# Patient Record
Sex: FEMALE | Race: BLACK OR AFRICAN AMERICAN | Employment: FULL TIME
[De-identification: names, ages, dates, MRNs, and addresses within clinical notes are randomized per-mention and may not be internally consistent; named-entity substitution may affect disease eponyms.]

---

## 2021-05-14 PROBLEM — C73 PAPILLARY THYROID CARCINOMA (HCC): Status: ACTIVE | Noted: 2021-05-14

## 2022-09-17 ENCOUNTER — NURSE TRIAGE (OUTPATIENT)
Dept: OTHER | Facility: CLINIC | Age: 57
End: 2022-09-17

## 2022-09-17 NOTE — TELEPHONE ENCOUNTER
Subjective: Caller states \"probable yeast innfection\"     Current Symptoms: white vaginal discharge itching and painful unrination, fatigue also and getting worse on urgency or frequency with urination no fevers c/o fatigue, and mild lower abd pain no significant med hx noted    Recommended to be seen in24 hours either [facility] or [facility]    Onset: 3 days ago; gradual    Associated Symptoms: NA    Pain Severity: 7/10; burning; intermittent    Temperature: none       What has been tried: nothing    LMP: NA Pregnant: NA    Recommended disposition: See PCP within 24 Hours    Care advice provided, patient verbalizes understanding; denies any other questions or concerns; instructed to call back for any new or worsening symptoms. This triage is a result of a call to [address] [place] [address]. Please do not respond to the triage nurse through this encounter. Any subsequent communication should be directly with the patient.       Reason for Disposition   [1] Mild lower abdominal pain comes and goes (cramps) AND [2] lasts > 24 hours    Protocols used: Vaginal Discharge-ADULT-

## 2023-09-08 ENCOUNTER — OFFICE VISIT (OUTPATIENT)
Dept: PRIMARY CARE | Facility: CLINIC | Age: 58
End: 2023-09-08
Payer: COMMERCIAL

## 2023-09-08 VITALS
HEART RATE: 98 BPM | TEMPERATURE: 97.8 F | HEIGHT: 69 IN | BODY MASS INDEX: 32.73 KG/M2 | SYSTOLIC BLOOD PRESSURE: 116 MMHG | DIASTOLIC BLOOD PRESSURE: 75 MMHG | WEIGHT: 221 LBS

## 2023-09-08 DIAGNOSIS — Z13.1 SCREENING FOR DIABETES MELLITUS: ICD-10-CM

## 2023-09-08 DIAGNOSIS — R59.0 LYMPHADENOPATHY OF HEAD AND NECK REGION: ICD-10-CM

## 2023-09-08 DIAGNOSIS — Z13.6 SCREENING FOR CARDIOVASCULAR CONDITION: ICD-10-CM

## 2023-09-08 DIAGNOSIS — N95.1 MENOPAUSAL SYNDROME (HOT FLASHES): ICD-10-CM

## 2023-09-08 DIAGNOSIS — Z12.11 ENCOUNTER FOR SCREENING FOR MALIGNANT NEOPLASM OF COLON: ICD-10-CM

## 2023-09-08 DIAGNOSIS — E89.0 POSTABLATIVE HYPOTHYROIDISM: Primary | ICD-10-CM

## 2023-09-08 DIAGNOSIS — C73 PAPILLARY THYROID CARCINOMA (MULTI): ICD-10-CM

## 2023-09-08 PROBLEM — R10.9 ABDOMINAL PAIN: Status: ACTIVE | Noted: 2023-09-08

## 2023-09-08 PROBLEM — R25.2 LEG CRAMPS: Status: ACTIVE | Noted: 2023-09-08

## 2023-09-08 PROBLEM — M54.9 BACK PAIN: Status: ACTIVE | Noted: 2023-09-08

## 2023-09-08 PROCEDURE — 99214 OFFICE O/P EST MOD 30 MIN: CPT | Performed by: INTERNAL MEDICINE

## 2023-09-08 PROCEDURE — 1036F TOBACCO NON-USER: CPT | Performed by: INTERNAL MEDICINE

## 2023-09-08 RX ORDER — VENLAFAXINE HYDROCHLORIDE 37.5 MG/1
37.5 TABLET, EXTENDED RELEASE ORAL DAILY
Qty: 90 TABLET | Refills: 3 | Status: SHIPPED | OUTPATIENT
Start: 2023-09-08 | End: 2023-09-11 | Stop reason: ALTCHOICE

## 2023-09-08 RX ORDER — LEVOTHYROXINE SODIUM 137 UG/1
TABLET ORAL
Qty: 96 TABLET | Refills: 3 | Status: SHIPPED | OUTPATIENT
Start: 2023-09-08

## 2023-09-08 RX ORDER — LEVOTHYROXINE SODIUM 137 UG/1
137 TABLET ORAL DAILY
COMMUNITY
End: 2023-09-08 | Stop reason: SDUPTHER

## 2023-09-08 RX ORDER — IBUPROFEN 800 MG/1
800 TABLET ORAL 3 TIMES DAILY PRN
COMMUNITY

## 2023-09-08 ASSESSMENT — ENCOUNTER SYMPTOMS
CHILLS: 0
COUGH: 0
SHORTNESS OF BREATH: 0
POLYDIPSIA: 0
PALPITATIONS: 0
FEVER: 0

## 2023-09-08 NOTE — PROGRESS NOTES
"Subjective   Patient ID: Simi Jefferson is a 58 y.o. female who presents for Establish Care (TC from [place]).    58-year-old female with a history of papillary thyroid cancer presents today to establish care after the [facility] of her PCP at the end of 2022.  I performed a detailed review of the patient's chart medical history records previous lab testing as part of today's visit.  I updated her chart according to any medical changes.  I updated her refills to her pharmacy as requested.  She is overdue for blood work which was ordered.  She is overdue for a mammogram which we discussed in detail and was ordered.  And she is due for colon cancer screening at this time last completing a  Cologuard in 2019.    In addition to the above issues there is a new concern of a left anterior neck palpable nodule/mass present for approximately 4 weeks.  Patient denies B symptoms.  Asymptomatic for upper respiratory disease cough sinus throat pain or tooth pain.  Discovered by accident when doing make-up.  There is been no pain in the region.    Patient notes postmenopausal hot flashes and sweats.  Interested in starting medication to treat this.  Sweats tends to be a little bit at night more than anything, question possibly related to issue as detailed above by more likely menopausal hot flashes we will delineate with further testing.         Review of Systems   Constitutional:  Negative for chills and fever.   Respiratory:  Negative for cough and shortness of breath.    Cardiovascular:  Negative for chest pain and palpitations.   Endocrine: Negative for polydipsia and polyuria.       Objective   /75 (Patient Position: Sitting)   Pulse 98   Temp 36.6 °C (97.8 °F) (Temporal)   Ht 1.74 m (5' 8.5\")   Wt 100 kg (221 lb)   BMI 33.11 kg/m²     Physical Exam  Constitutional:       Appearance: Normal appearance.   HENT:      Head: Normocephalic and atraumatic.   Eyes:      Extraocular Movements: Extraocular movements intact.      " Pupils: Pupils are equal, round, and reactive to light.   Neck:      Thyroid: No thyroid mass or thyromegaly.      Vascular: No carotid bruit.   Cardiovascular:      Rate and Rhythm: Normal rate and regular rhythm.      Heart sounds: No murmur heard.     No friction rub. No gallop.   Pulmonary:      Effort: No respiratory distress.      Breath sounds: No wheezing, rhonchi or rales.   Musculoskeletal:      Cervical back: Neck supple.      Right lower leg: No edema.      Left lower leg: No edema.   Lymphadenopathy:      Cervical: Cervical adenopathy (Left anterior cervical chain isolated single palpable lymph node around the level of the thyroid cartilage) present.   Neurological:      Mental Status: She is alert.         Assessment/Plan   Problem List Items Addressed This Visit       Papillary thyroid carcinoma (CMS/HCC)    Relevant Orders    CBC    Comprehensive Metabolic Panel    Lipid Panel    Hemoglobin A1C     Other Visit Diagnoses       Postablative hypothyroidism    -  Primary    Relevant Medications    levothyroxine (Synthroid, Levoxyl) 137 mcg tablet    Other Relevant Orders    CBC    Comprehensive Metabolic Panel    Lipid Panel    Hemoglobin A1C    Lymphadenopathy of head and neck region        Relevant Orders    CT soft tissue neck w IV contrast    Menopausal syndrome (hot flashes)        Relevant Medications    venlafaxine 37.5 mg 24 hr tablet    Screening for cardiovascular condition        Relevant Orders    CBC    Comprehensive Metabolic Panel    Lipid Panel    Hemoglobin A1C    Encounter for screening for malignant neoplasm of colon        Relevant Orders    Cologuard® colon cancer screening    BI mammo bilateral screening tomosynthesis    Screening for diabetes mellitus        Relevant Orders    CBC    Comprehensive Metabolic Panel    Lipid Panel    Hemoglobin A1C

## 2023-09-09 ENCOUNTER — TELEPHONE (OUTPATIENT)
Dept: PRIMARY CARE | Facility: CLINIC | Age: 58
End: 2023-09-09
Payer: COMMERCIAL

## 2023-09-11 DIAGNOSIS — N95.1 MENOPAUSAL SYNDROME (HOT FLASHES): Primary | ICD-10-CM

## 2023-09-11 RX ORDER — VENLAFAXINE HYDROCHLORIDE 37.5 MG/1
37.5 CAPSULE, EXTENDED RELEASE ORAL DAILY
Qty: 90 CAPSULE | Refills: 3 | Status: SHIPPED | OUTPATIENT
Start: 2023-09-11 | End: 2024-09-10

## 2023-11-18 DIAGNOSIS — J01.40 SUBACUTE PANSINUSITIS: Primary | ICD-10-CM

## 2023-11-18 RX ORDER — AMOXICILLIN AND CLAVULANATE POTASSIUM 875; 125 MG/1; MG/1
875 TABLET, FILM COATED ORAL 2 TIMES DAILY
Qty: 20 TABLET | Refills: 0 | Status: SHIPPED | OUTPATIENT
Start: 2023-11-18 | End: 2023-11-28

## 2024-05-23 ENCOUNTER — LAB (OUTPATIENT)
Dept: LAB | Facility: LAB | Age: 59
End: 2024-05-23
Payer: COMMERCIAL

## 2024-05-23 DIAGNOSIS — C73 PAPILLARY THYROID CARCINOMA (MULTI): ICD-10-CM

## 2024-05-23 DIAGNOSIS — Z13.1 SCREENING FOR DIABETES MELLITUS: ICD-10-CM

## 2024-05-23 DIAGNOSIS — E89.0 POSTABLATIVE HYPOTHYROIDISM: ICD-10-CM

## 2024-05-23 DIAGNOSIS — Z13.6 SCREENING FOR CARDIOVASCULAR CONDITION: ICD-10-CM

## 2024-05-23 LAB
ALBUMIN SERPL BCP-MCNC: 4.6 G/DL (ref 3.4–5)
ALP SERPL-CCNC: 56 U/L (ref 33–110)
ALT SERPL W P-5'-P-CCNC: 18 U/L (ref 7–45)
ANION GAP SERPL CALC-SCNC: 15 MMOL/L (ref 10–20)
AST SERPL W P-5'-P-CCNC: 26 U/L (ref 9–39)
BILIRUB SERPL-MCNC: 0.5 MG/DL (ref 0–1.2)
BUN SERPL-MCNC: 14 MG/DL (ref 6–23)
CALCIUM SERPL-MCNC: 9.7 MG/DL (ref 8.6–10.6)
CHLORIDE SERPL-SCNC: 101 MMOL/L (ref 98–107)
CHOLEST SERPL-MCNC: 342 MG/DL (ref 0–199)
CHOLESTEROL/HDL RATIO: 4.2
CO2 SERPL-SCNC: 29 MMOL/L (ref 21–32)
CREAT SERPL-MCNC: 0.83 MG/DL (ref 0.5–1.05)
EGFRCR SERPLBLD CKD-EPI 2021: 82 ML/MIN/1.73M*2
ERYTHROCYTE [DISTWIDTH] IN BLOOD BY AUTOMATED COUNT: 13.3 % (ref 11.5–14.5)
GLUCOSE SERPL-MCNC: 86 MG/DL (ref 74–99)
HCT VFR BLD AUTO: 43.9 % (ref 36–46)
HDLC SERPL-MCNC: 80.5 MG/DL
HGB BLD-MCNC: 14.4 G/DL (ref 12–16)
LDLC SERPL CALC-MCNC: 183 MG/DL
MCH RBC QN AUTO: 28.2 PG (ref 26–34)
MCHC RBC AUTO-ENTMCNC: 32.8 G/DL (ref 32–36)
MCV RBC AUTO: 86 FL (ref 80–100)
NON HDL CHOLESTEROL: 262 MG/DL (ref 0–149)
NRBC BLD-RTO: 0 /100 WBCS (ref 0–0)
PLATELET # BLD AUTO: 269 X10*3/UL (ref 150–450)
POTASSIUM SERPL-SCNC: 4.1 MMOL/L (ref 3.5–5.3)
PROT SERPL-MCNC: 8.1 G/DL (ref 6.4–8.2)
RBC # BLD AUTO: 5.1 X10*6/UL (ref 4–5.2)
SODIUM SERPL-SCNC: 141 MMOL/L (ref 136–145)
TRIGL SERPL-MCNC: 393 MG/DL (ref 0–149)
VLDL: 79 MG/DL (ref 0–40)
WBC # BLD AUTO: 7.8 X10*3/UL (ref 4.4–11.3)

## 2024-05-23 PROCEDURE — 85027 COMPLETE CBC AUTOMATED: CPT

## 2024-05-23 PROCEDURE — 80053 COMPREHEN METABOLIC PANEL: CPT

## 2024-05-23 PROCEDURE — 80061 LIPID PANEL: CPT

## 2024-05-23 PROCEDURE — 36415 COLL VENOUS BLD VENIPUNCTURE: CPT

## 2024-08-30 ENCOUNTER — OFFICE VISIT (OUTPATIENT)
Dept: PRIMARY CARE | Facility: CLINIC | Age: 59
End: 2024-08-30
Payer: COMMERCIAL

## 2024-08-30 VITALS
TEMPERATURE: 98 F | WEIGHT: 231.4 LBS | SYSTOLIC BLOOD PRESSURE: 144 MMHG | HEART RATE: 106 BPM | BODY MASS INDEX: 34.67 KG/M2 | DIASTOLIC BLOOD PRESSURE: 86 MMHG

## 2024-08-30 DIAGNOSIS — E78.2 MIXED HYPERLIPIDEMIA: Primary | ICD-10-CM

## 2024-08-30 PROCEDURE — 99214 OFFICE O/P EST MOD 30 MIN: CPT | Performed by: INTERNAL MEDICINE

## 2024-08-30 PROCEDURE — 1036F TOBACCO NON-USER: CPT | Performed by: INTERNAL MEDICINE

## 2024-08-30 RX ORDER — ATORVASTATIN CALCIUM 20 MG/1
20 TABLET, FILM COATED ORAL DAILY
Qty: 100 TABLET | Refills: 3 | Status: SHIPPED | OUTPATIENT
Start: 2024-08-30 | End: 2025-10-04

## 2024-08-30 ASSESSMENT — ENCOUNTER SYMPTOMS
POLYDIPSIA: 0
PALPITATIONS: 0
COUGH: 0
SHORTNESS OF BREATH: 0
FEVER: 0
CHILLS: 0

## 2024-08-30 ASSESSMENT — PAIN SCALES - GENERAL: PAINLEVEL: 0-NO PAIN

## 2024-08-30 NOTE — PROGRESS NOTES
Subjective   Patient ID: Simi Jefferson is a 59 y.o. female who presents for Follow-up.    59-year-old female presents today for follow-up on hyperlipidemia.  Significantly elevated in all 3 levels HDL LDL and triglycerides.  Likely familial hyperlipidemia.  Worsened by diet lifestyle and weight currently.  Patient advised about the elevated risk given the profile and ratio of cholesterol at this time also by the significantly elevated LDL greater than 180 and triglyceride level more than 2 times the upper limit of normal.  Multiple risk factors as a result of this finding including fatty liver disease pancreatitis and increased cardiovascular risk.  For this reason I am advising initiating cholesterol medication patient educated about these details and provided education about the right commended medication including potential side effects to monitor for.  She was agreeable to starting medication at the conclusion of our discussion and this was sent off to her pharmacy.    Patient also inquired about the possibility of using weight loss medication as part of a plan to assist her with long-term weight loss.  She has a history of thyroid cancer but it is papillary thyroid cancer and not medullary thyroid cancer.  There is no family history of medullary thyroid cancer nor is there a family history of multiple endocrine neoplasia.  Patient was provided detailed counseling about the risks and benefits of those medications in addition to our visit today.  She was also provided detailed education regarding how to potentially research cost saving benefits and to discuss with her insurance and the potential for coverage.  She will do independent research into cost coverage and viability for use based on these costs and then notify me if she wishes to move forward with medication in this respect to assist her with her long-term goals.         Review of Systems   Constitutional:  Negative for chills and fever.   Respiratory:   Negative for cough and shortness of breath.    Cardiovascular:  Negative for chest pain and palpitations.   Endocrine: Negative for polydipsia and polyuria.       Objective   /86 (BP Location: Right arm, Patient Position: Sitting, BP Cuff Size: Adult)   Pulse 106   Temp 36.7 °C (98 °F) (Oral)   Wt 105 kg (231 lb 6.4 oz)   BMI 34.67 kg/m²     Physical Exam  Constitutional:       Appearance: Normal appearance.   HENT:      Head: Normocephalic and atraumatic.   Eyes:      Extraocular Movements: Extraocular movements intact.      Pupils: Pupils are equal, round, and reactive to light.   Neck:      Thyroid: No thyroid mass or thyromegaly.      Vascular: No carotid bruit.   Cardiovascular:      Rate and Rhythm: Normal rate and regular rhythm.      Heart sounds: No murmur heard.     No friction rub. No gallop.   Pulmonary:      Effort: No respiratory distress.      Breath sounds: No wheezing, rhonchi or rales.   Musculoskeletal:      Cervical back: Neck supple.      Right lower leg: No edema.      Left lower leg: No edema.   Lymphadenopathy:      Cervical: No cervical adenopathy.   Neurological:      Mental Status: She is alert.         Assessment/Plan   Problem List Items Addressed This Visit    None  Visit Diagnoses         Codes    Mixed hyperlipidemia    -  Primary E78.2    Relevant Medications    atorvastatin (Lipitor) 20 mg tablet    BMI 34.0-34.9,adult     Z68.34

## 2024-09-04 DIAGNOSIS — E89.0 POSTABLATIVE HYPOTHYROIDISM: ICD-10-CM

## 2024-09-06 DIAGNOSIS — E89.0 POSTABLATIVE HYPOTHYROIDISM: ICD-10-CM

## 2024-09-06 RX ORDER — LEVOTHYROXINE SODIUM 137 UG/1
TABLET ORAL
Refills: 0 | OUTPATIENT
Start: 2024-09-06

## 2024-09-06 RX ORDER — LEVOTHYROXINE SODIUM 137 UG/1
TABLET ORAL
Qty: 96 TABLET | Refills: 3 | Status: SHIPPED | OUTPATIENT
Start: 2024-09-06

## 2024-09-09 ENCOUNTER — APPOINTMENT (OUTPATIENT)
Dept: PRIMARY CARE | Facility: CLINIC | Age: 59
End: 2024-09-09
Payer: COMMERCIAL

## 2024-09-09 DIAGNOSIS — E89.0 POSTABLATIVE HYPOTHYROIDISM: ICD-10-CM

## 2024-09-09 RX ORDER — LEVOTHYROXINE SODIUM 137 UG/1
TABLET ORAL
Qty: 96 TABLET | Refills: 3 | Status: SHIPPED | OUTPATIENT
Start: 2024-09-09

## 2024-10-04 DIAGNOSIS — E78.2 MIXED HYPERLIPIDEMIA: ICD-10-CM

## 2024-10-04 RX ORDER — ATORVASTATIN CALCIUM 20 MG/1
20 TABLET, FILM COATED ORAL DAILY
Qty: 100 TABLET | Refills: 3 | Status: SHIPPED | OUTPATIENT
Start: 2024-10-04 | End: 2025-11-08

## 2024-11-05 ENCOUNTER — TELEPHONE (OUTPATIENT)
Dept: PRIMARY CARE | Facility: CLINIC | Age: 59
End: 2024-11-05

## 2024-11-05 ENCOUNTER — CLINICAL SUPPORT (OUTPATIENT)
Dept: INFECTIOUS DISEASES | Facility: CLINIC | Age: 59
End: 2024-11-05
Payer: COMMERCIAL

## 2024-11-05 DIAGNOSIS — Z71.84 COUNSELING FOR TRAVEL: Primary | ICD-10-CM

## 2024-11-05 PROCEDURE — 99202U03 TRAVEL CONSULT (U03): Performed by: INTERNAL MEDICINE

## 2024-11-05 PROCEDURE — 90471U01 YELLOW FEVER VACCINE SQ: Performed by: INTERNAL MEDICINE

## 2024-11-05 PROCEDURE — 90717 YELLOW FEVER VACCINE SUBQ: CPT | Performed by: INTERNAL MEDICINE

## 2024-11-05 PROCEDURE — 90632 HEPA VACCINE ADULT IM: CPT | Performed by: INTERNAL MEDICINE

## 2024-11-05 PROCEDURE — 90471 IMMUNIZATION ADMIN: CPT | Performed by: INTERNAL MEDICINE

## 2024-11-05 RX ORDER — LOPERAMIDE HYDROCHLORIDE 2 MG/1
CAPSULE ORAL
Qty: 11 CAPSULE | Refills: 1 | Status: SHIPPED | OUTPATIENT
Start: 2024-11-05

## 2024-11-05 RX ORDER — CIPROFLOXACIN 500 MG/1
500 TABLET ORAL 2 TIMES DAILY
Qty: 6 TABLET | Refills: 0 | Status: SHIPPED | OUTPATIENT
Start: 2024-11-05 | End: 2024-11-08

## 2024-11-05 NOTE — PROGRESS NOTES
Patient and spouse are taking a 35-day cruise [place].  All mites on the boat.  No evenings off the boat.  They think they had yellow fever in the past but cannot find the card.  No need yellow fever for getting in and out of some countries we discussed risks and benefits  We discussed mosquito precautions off the boat.  With no nights off the boat I did not think he needed malaria prophylaxis.  We did discussed food water precautions off the boat.  Cipro and Imodium    and hepatitis A many years ago I think she needs a boost.  Typhoid low risk.  We discussed rabie risk  Risk

## 2024-11-05 NOTE — TELEPHONE ENCOUNTER
Patient and her  going to [facility] in the next two weeks. They need a yellow fever vaccination and needs approval from the PCP in order to get this vaccination. Needs to be done 10 days before the trip.

## 2024-11-05 NOTE — PROGRESS NOTES
Patient and spouse are taking a 35-day cruise [place].  All mites on the boat.  No evenings off the boat.  They think they had yellow fever in the past but cannot find the card.  need yellow fever for getting in and out of some countries we discussed risks and benefits  We discussed mosquito precautions off the boat.  With no nights off the boat I did not think he needed malaria prophylaxis.  We did discussed food water precautions off the boat.  Cipro and Imodium    and hepatitis A many years ago I think she needs a boost.  Typhoid low risk.  We discussed rabie risk  Risk

## 2024-11-07 DIAGNOSIS — N95.1 MENOPAUSAL SYNDROME (HOT FLASHES): ICD-10-CM

## 2024-11-08 RX ORDER — VENLAFAXINE HYDROCHLORIDE 37.5 MG/1
CAPSULE, EXTENDED RELEASE ORAL
Qty: 90 CAPSULE | Refills: 2 | Status: SHIPPED | OUTPATIENT
Start: 2024-11-08

## 2024-11-08 NOTE — TELEPHONE ENCOUNTER
I have attached a letter stating there are no travel restrictions or restrictions on vaccination for this patient [facility] which [facility] may provide to the vaccine clinic

## 2025-01-24 DIAGNOSIS — E89.0 POSTABLATIVE HYPOTHYROIDISM: ICD-10-CM

## 2025-01-24 DIAGNOSIS — N95.1 MENOPAUSAL SYNDROME (HOT FLASHES): ICD-10-CM

## 2025-01-24 DIAGNOSIS — E78.2 MIXED HYPERLIPIDEMIA: ICD-10-CM

## 2025-01-24 RX ORDER — LEVOTHYROXINE SODIUM 137 UG/1
TABLET ORAL
Qty: 96 TABLET | Refills: 3 | Status: SHIPPED | OUTPATIENT
Start: 2025-01-24

## 2025-01-24 RX ORDER — ATORVASTATIN CALCIUM 20 MG/1
20 TABLET, FILM COATED ORAL DAILY
Qty: 100 TABLET | Refills: 3 | Status: SHIPPED | OUTPATIENT
Start: 2025-01-24 | End: 2026-02-28

## 2025-01-24 RX ORDER — VENLAFAXINE HYDROCHLORIDE 37.5 MG/1
37.5 CAPSULE, EXTENDED RELEASE ORAL DAILY
Qty: 90 CAPSULE | Refills: 3 | Status: SHIPPED | OUTPATIENT
Start: 2025-01-24

## 2025-02-19 ENCOUNTER — OFFICE VISIT (OUTPATIENT)
Dept: PRIMARY CARE | Facility: CLINIC | Age: 60
End: 2025-02-19
Payer: COMMERCIAL

## 2025-02-19 VITALS — TEMPERATURE: 97.2 F | HEART RATE: 89 BPM | SYSTOLIC BLOOD PRESSURE: 98 MMHG | DIASTOLIC BLOOD PRESSURE: 66 MMHG

## 2025-02-19 DIAGNOSIS — J06.9 VIRAL URI WITH COUGH: Primary | ICD-10-CM

## 2025-02-19 DIAGNOSIS — R22.1 NECK MASS: ICD-10-CM

## 2025-02-19 PROCEDURE — 1036F TOBACCO NON-USER: CPT | Performed by: INTERNAL MEDICINE

## 2025-02-19 PROCEDURE — 99214 OFFICE O/P EST MOD 30 MIN: CPT | Performed by: INTERNAL MEDICINE

## 2025-02-19 RX ORDER — IBUPROFEN 800 MG/1
800 TABLET ORAL 3 TIMES DAILY PRN
Qty: 60 TABLET | Refills: 3 | Status: SHIPPED | OUTPATIENT
Start: 2025-02-19

## 2025-02-19 RX ORDER — BENZONATATE 100 MG/1
100 CAPSULE ORAL 3 TIMES DAILY PRN
Qty: 21 CAPSULE | Refills: 0 | Status: SHIPPED | OUTPATIENT
Start: 2025-02-19 | End: 2025-02-26

## 2025-02-19 ASSESSMENT — ENCOUNTER SYMPTOMS
SHORTNESS OF BREATH: 0
POLYDIPSIA: 0
FLU SYMPTOMS: 1
SORE THROAT: 1
PALPITATIONS: 0
CHILLS: 0
RHINORRHEA: 1
FATIGUE: 1
COUGH: 1
FACIAL SWELLING: 0
FEVER: 0

## 2025-02-19 NOTE — PROGRESS NOTES
Subjective   Patient ID: Simi Jefferson is a 60 y.o. female who presents for Flu Symptoms (SINCE FRIDAY).    [place] Acute onset illness Friday of last week- 6 days prior. Fatigue, cough, headache,     On examination today identified a large anterior neck mass along the sternocleidomastoid muscle on the left side of the anterior neck.  On evaluation this was firm relatively fixed possibly lymph node but certainly could not tell for 100% sure the exact source given its size.  Possibly related to the recent viral illness but on further questioning the patient informed me that she had noticed asymmetry in the neck dating back as far as 6 months ago.  Given the possibility for this long developing anterior neck mass I am advising for CT with contrast of the soft tissues of the neck but I would ideally prefer it to be done approximately 2 weeks from now given her recent illness.  I do not want to over read the CAT scan assuming that there may be other reactive lymph nodes or reactive length lymphadenopathy present.  But given the size of this mass I do believe we need imaging and have ordered accordingly.  Patient will be updated further when testing is completed.  Remote history of smoking in a very limited amount 1 year in high school.    Flu Symptoms  Associated symptoms include coughing, fatigue and a sore throat. Pertinent negatives include no chest pain, chills or fever.        Review of Systems   Constitutional:  Positive for fatigue. Negative for chills and fever.   HENT:  Positive for rhinorrhea and sore throat. Negative for ear discharge, ear pain, facial swelling and hearing loss.    Respiratory:  Positive for cough. Negative for shortness of breath.    Cardiovascular:  Negative for chest pain and palpitations.   Endocrine: Negative for polydipsia and polyuria.       Objective   BP 98/66   Pulse 89   Temp 36.2 °C (97.2 °F) (Temporal)     Physical Exam  HENT:      Head: Normocephalic and  atraumatic.      Right Ear: Tympanic membrane and ear canal normal. There is no impacted cerumen.      Left Ear: Tympanic membrane and ear canal normal. There is no impacted cerumen.      Nose: Nose normal.      Mouth/Throat:      Mouth: Mucous membranes are moist.      Pharynx: Oropharynx is clear.   Eyes:      Extraocular Movements: Extraocular movements intact.      Pupils: Pupils are equal, round, and reactive to light.   Cardiovascular:      Rate and Rhythm: Normal rate.   Pulmonary:      Effort: No respiratory distress.      Breath sounds: No wheezing, rhonchi or rales.   Musculoskeletal:      Cervical back: Neck supple. No tenderness.   Lymphadenopathy:      Cervical: Cervical adenopathy (large anterior left neck mass identified) present.         Assessment/Plan   Assessment & Plan  Viral URI with cough    Orders:    RSV PCR    Sars-CoV-2 PCR    Influenza A, and B PCR    ibuprofen (IBU) 800 mg tablet; Take 1 tablet (800 mg) by mouth 3 times a day as needed for mild pain (1 - 3) or moderate pain (4 - 6). take [facility] food    benzonatate (Tessalon) 100 mg capsule; Take 1 capsule (100 mg) by mouth 3 times a day as needed for cough for up to 7 days. Do not crush or chew.    Neck mass    Orders:    CT soft tissue neck w IV contrast; Future    Basic metabolic panel; Future

## 2025-02-20 LAB
FLUAV RNA RESP QL NAA+PROBE: DETECTED
FLUBV RNA RESP QL NAA+PROBE: NOT DETECTED
RSV RNA RESP QL NAA+PROBE: NOT DETECTED
SARS-COV-2 RNA RESP QL NAA+PROBE: NOT DETECTED

## 2025-02-28 LAB
ANION GAP SERPL CALCULATED.4IONS-SCNC: 13 MMOL/L (CALC) (ref 7–17)
BUN SERPL-MCNC: 12 MG/DL (ref 7–25)
BUN/CREAT SERPL: NORMAL (CALC) (ref 6–22)
CALCIUM SERPL-MCNC: 10 MG/DL (ref 8.6–10.4)
CHLORIDE SERPL-SCNC: 102 MMOL/L (ref 98–110)
CO2 SERPL-SCNC: 27 MMOL/L (ref 20–32)
CREAT SERPL-MCNC: 0.78 MG/DL (ref 0.5–1.03)
EGFRCR SERPLBLD CKD-EPI 2021: 87 ML/MIN/1.73M2
GLUCOSE SERPL-MCNC: 81 MG/DL (ref 65–99)
POTASSIUM SERPL-SCNC: 3.9 MMOL/L (ref 3.5–5.3)
SODIUM SERPL-SCNC: 142 MMOL/L (ref 135–146)

## 2025-03-03 ENCOUNTER — HOSPITAL ENCOUNTER (OUTPATIENT)
Dept: RADIOLOGY | Facility: CLINIC | Age: 60
Discharge: HOME | End: 2025-03-03
Payer: COMMERCIAL

## 2025-03-03 DIAGNOSIS — R22.1 NECK MASS: ICD-10-CM

## 2025-03-03 PROCEDURE — 70491 CT SOFT TISSUE NECK W/DYE: CPT | Performed by: RADIOLOGY

## 2025-03-03 PROCEDURE — 70491 CT SOFT TISSUE NECK W/DYE: CPT

## 2025-03-03 PROCEDURE — 2550000001 HC RX 255 CONTRASTS: Performed by: INTERNAL MEDICINE

## 2025-03-03 RX ADMIN — IOHEXOL 68 ML: 350 INJECTION, SOLUTION INTRAVENOUS at 12:26

## 2025-03-05 ENCOUNTER — APPOINTMENT (OUTPATIENT)
Facility: CLINIC | Age: 60
End: 2025-03-05
Payer: COMMERCIAL

## 2025-03-12 ENCOUNTER — APPOINTMENT (OUTPATIENT)
Facility: CLINIC | Age: 60
End: 2025-03-12
Payer: COMMERCIAL

## 2025-03-12 VITALS — WEIGHT: 189 LBS | BODY MASS INDEX: 27.99 KG/M2 | HEIGHT: 69 IN

## 2025-03-12 DIAGNOSIS — R22.1 NECK MASS: ICD-10-CM

## 2025-03-12 PROCEDURE — 1036F TOBACCO NON-USER: CPT | Performed by: OTOLARYNGOLOGY

## 2025-03-12 PROCEDURE — 99204 OFFICE O/P NEW MOD 45 MIN: CPT | Performed by: OTOLARYNGOLOGY

## 2025-03-12 PROCEDURE — 3008F BODY MASS INDEX DOCD: CPT | Performed by: OTOLARYNGOLOGY

## 2025-03-12 PROCEDURE — 31575 DIAGNOSTIC LARYNGOSCOPY: CPT | Performed by: OTOLARYNGOLOGY

## 2025-03-12 ASSESSMENT — PATIENT HEALTH QUESTIONNAIRE - PHQ9
SUM OF ALL RESPONSES TO PHQ9 QUESTIONS 1 AND 2: 0
2. FEELING DOWN, DEPRESSED OR HOPELESS: NOT AT ALL
1. LITTLE INTEREST OR PLEASURE IN DOING THINGS: NOT AT ALL

## 2025-03-12 NOTE — PROGRESS NOTES
ENT Outpatient Consultation    Chief Complaint: Neck mass.  History of thyroid cancer  History Of Present Illness  Simi Jefferson is a 60 y.o. female presents for evaluation of a neck mass and a prior history of thyroid cancer.  Patient originally had an FNA and March 2021 that showed FLUS.  Molecular testing was suspicious for malignancy and she underwent total thyroidectomy in May 2021.  She received radioactive iodine in June 2021 and her posttreatment whole-body scan was negative.  She noticed a left-sided neck mass approximately 6 months ago and at first thought it was swollen gland.  She recently had a CT scan of her neck that confirmed an enlarged pathologic lymph node on the left-hand side.  She is here today for evaluation.  She has not had any change in voice or swallowing.  Denies any throat pain.  From available records that her last thyroglobulin was in 2022 and at that time was 28.  Her antibodies were negative     Past Medical History  She has a past medical history of Cancer (Multi) (05/2021), Personal history of other diseases of the respiratory system (07/19/2013), Personal history of other diseases of the respiratory system (05/25/2016), and Personal history of other medical treatment.    Surgical History  She has no past surgical history on file.     Social History  She reports that she quit smoking about 38 years ago. Her smoking use included cigarettes. She started smoking about 40 years ago. She has been exposed to tobacco smoke. She has never used smokeless tobacco. She reports current alcohol use of about 2.0 standard drinks of alcohol per week. She reports that she does not use drugs.    Family History  Family History   Problem Relation Name Age of Onset    Cancer Father [facility]     Cancer Maternal Grandmother Unique Robina         Allergies  Patient has no known allergies.     Physical Exam:  CONSTITUTIONAL: No acute distress at rest.  Emotional during discussion  VOICE:  No  "hoarseness or other abnormality  RESPIRATION:  Breathing comfortably, no stridor  CV:  No clubbing/cyanosis/edema in hands  EYES:  EOM intact, sclera normal  NEURO:  Alert and oriented times 3, Cranial nerves II-XII grossly intact and symmetric bilaterally  HEAD AND FACE:  Symmetric facial features, no masses or lesions, sinuses non-tender to palpation  SALIVARY GLANDS:  Parotid and submandibular glands normal bilaterally  EARS:  Normal external ears, external auditory canals, and TMs to otoscopy, normal hearing to whispered voice.  NOSE:  External nose midline, anterior rhinoscopy is normal with limited visualization to the anterior aspect of the interior turbinates, no bleeding or drainage, no lesions  ORAL CAVITY/OROPHARYNX/LIPS:  Normal mucous membranes, normal floor of mouth/tongue/OP, no masses or lesions  PHARYNGEAL WALLS:  No masses or lesions  NECK/LYMPH: Palpable left-sided lymphadenopathy  SKIN: Thyroidectomy scar well-healed  PSYCH:  Alert and oriented with appropriate mood and affect    Procedure Note: Flexible Nasolaryngoscopy  Verbal informed consent was obtained from the patient/patient's guardian. 4% lidocaine mixed with phenylephrine was prepared and dripped into the nose. It was placed in the right naris. Following an appropriate amount of time to allow for adequate anesthesia, a flexible fiberoptic nasolaryngoscope was placed into the patient's right naris. The nasal cavity, nasopharynx, oropharynx, hypopharynx, and all endolaryngeal structures were visualized and were normal except as listed below. Significant findings included:  -True vocal cord mobile bilaterally, no concern for mucosal lesion       Last Recorded Vitals  Height 1.753 m (5' 9\"), weight 85.7 kg (189 lb).    Relevant Results      Results for orders placed or performed in visit on 03/12/25 (from the past 24 hours)   TSH with reflex to Free T4 if abnormal   Result Value Ref Range    TSH W/REFLEX TO FT4 0.01 (L) 0.40 - 4.50 mIU/L    " T4, FREE     Thyroglobulin and Antithyroglobulin   Result Value Ref Range    THYROGLOBULIN ANTIBODY     T3   Result Value Ref Range    T3, TOTAL 75 (L) 76 - 181 ng/dL     CT soft tissue neck w IV contrast    Result Date: 3/3/2025  Interpreted By:  Fuad Hernandez, STUDY: CT SOFT TISSUE NECK W IV CONTRAST;  3/3/2025 12:31 pm   INDICATION: Signs/Symptoms:anterior left neck mass, Sternoclidomastoid mid neck..   ,R22.1 Localized swelling, mass and lump, neck   COMPARISON: None.   ACCESSION NUMBER(S): [id number]   ORDERING CLINICIAN: LILLIANA TATE   TECHNIQUE: Axial CT images of the neck were obtained.  The patient received 68 ML of Omnipaque 350 intravenous contrast agent. The images were reformatted in angled axial, coronal and sagittal planes.   FINDINGS: The left neck has a cystic mass lateral to and compressing the left jugular vein at the level of the carotid bifurcation with a large lobulated diffusely enhancing mass within the superior aspect of the cyst.   The entire mass is 3.4 x 3.6 x 6.9 cm. The diffusely enhancing lobulated mass superiorly is 2.4 x 3.7 x 3.1 cm.   The more inferior cystic portion has smooth thin rim extending inferiorly to the level of the thyroid gland. No connection with the thyroid gland is noted. The mass remains lateral to the jugular vein.   Oral Cavity, Pharynx and Larynx:  Evaluation oral cavity is limited by streak artifact from dental hardware.  The nasopharyngeal and oropharyngeal structures are unremarkable. The hypopharyngeal and laryngeal structures are unremarkable.   Retropharyngeal and Prevertebral Soft Tissues: Unremarkable.   Lymph nodes: There are few non specific bilateral neck nodes, probably reactive in etiology.   Neck vessels: Bilateral neck vessels are normal in course and caliber and appear patent.   Thyroid gland: The thyroid gland is unremarkable in size and appearance.   Parotid and submandibular glands: Bilateral parotid and submandibular glands are unremarkable  in appearance.   Paranasal Sinuses and Mastoids: Visualized paranasal sinuses and bilateral mastoids are clear.   Visualized orbital structures are unremarkable.   Visualized upper lungs are clear.   Cervical spine has degenerative disc changes with vacuum phenomena and loss of disc height and mild endplate spurring.       The left neck has a partially cystic mass coursing along the lateral aspect of the jugular vein with a lobulated diffusely enhancing solid component superiorly. No associated adenopathy is noted.     MACRO: None   Signed by: Fuad Hernandez 3/3/2025 5:12 PM Dictation workstation:   [id number]      CT scan was personally reviewed.  This shows left-sided lymphadenopathy.  There is also some concern for lymphadenopathy in the central and right neck that is smaller in size    Assessment and Plan  60 y.o. female with history of thyroid cancer that was treated with surgery and radioactive iodine in 2021.  Patient presents today with lymphadenopathy and concern for possible metastatic thyroid cancer.  Patient is very anxious regarding FNA and would like to have it done under sedation.  She also has multiple sites that are concerning on the CT scan.    -We will get updated thyroid function studies and thyroglobulin  -I will review CT scan with interventional radiology and place order for evaluation  -Follow-up after biopsy to finalize plan.  We discussed that this would likely be treated surgically.  We discussed some of the aspects regarding surgery and general details    Kelvin Faustin MD

## 2025-03-14 DIAGNOSIS — R22.1 NECK MASS: ICD-10-CM

## 2025-03-15 LAB
T3 SERPL-MCNC: 75 NG/DL (ref 76–181)
T4 FREE SERPL-MCNC: 2.2 NG/DL (ref 0.8–1.8)
THYROGLOB AB SERPL-ACNC: <1 IU/ML
THYROGLOB SERPL-MCNC: 43.9 NG/ML
TSH SERPL-ACNC: 0.01 MIU/L (ref 0.4–4.5)

## 2025-03-16 NOTE — RESULT ENCOUNTER NOTE
Previously reviewed results with patient.  Thyroglobulin increased from last time it was checked in 2022.  Has biopsy scheduled on March 25

## 2025-03-25 ENCOUNTER — HOSPITAL ENCOUNTER (OUTPATIENT)
Dept: RADIOLOGY | Facility: HOSPITAL | Age: 60
Discharge: HOME | End: 2025-03-25
Payer: COMMERCIAL

## 2025-03-25 VITALS
WEIGHT: 188.93 LBS | DIASTOLIC BLOOD PRESSURE: 78 MMHG | HEART RATE: 87 BPM | OXYGEN SATURATION: 97 % | BODY MASS INDEX: 29.65 KG/M2 | TEMPERATURE: 97.3 F | HEIGHT: 67 IN | SYSTOLIC BLOOD PRESSURE: 122 MMHG | RESPIRATION RATE: 18 BRPM

## 2025-03-25 DIAGNOSIS — R22.1 NECK MASS: ICD-10-CM

## 2025-03-25 PROCEDURE — 2500000005 HC RX 250 GENERAL PHARMACY W/O HCPCS: Performed by: RADIOLOGY

## 2025-03-25 PROCEDURE — 2500000004 HC RX 250 GENERAL PHARMACY W/ HCPCS (ALT 636 FOR OP/ED): Performed by: RADIOLOGY

## 2025-03-25 PROCEDURE — 7100000009 HC PHASE TWO TIME - INITIAL BASE CHARGE

## 2025-03-25 PROCEDURE — 99152 MOD SED SAME PHYS/QHP 5/>YRS: CPT | Performed by: RADIOLOGY

## 2025-03-25 PROCEDURE — 38505 NEEDLE BIOPSY LYMPH NODES: CPT

## 2025-03-25 PROCEDURE — 7100000010 HC PHASE TWO TIME - EACH INCREMENTAL 1 MINUTE

## 2025-03-25 PROCEDURE — 10006 FNA BX W/US GDN EA ADDL: CPT

## 2025-03-25 PROCEDURE — 10005 FNA BX W/US GDN 1ST LES: CPT

## 2025-03-25 PROCEDURE — 76942 ECHO GUIDE FOR BIOPSY: CPT

## 2025-03-25 PROCEDURE — 2720000007 HC OR 272 NO HCPCS

## 2025-03-25 PROCEDURE — 99153 MOD SED SAME PHYS/QHP EA: CPT

## 2025-03-25 PROCEDURE — 99153 MOD SED SAME PHYS/QHP EA: CPT | Performed by: RADIOLOGY

## 2025-03-25 PROCEDURE — 99152 MOD SED SAME PHYS/QHP 5/>YRS: CPT

## 2025-03-25 RX ORDER — MIDAZOLAM HYDROCHLORIDE 1 MG/ML
INJECTION INTRAMUSCULAR; INTRAVENOUS
Status: COMPLETED | OUTPATIENT
Start: 2025-03-25 | End: 2025-03-25

## 2025-03-25 RX ORDER — KETOROLAC TROMETHAMINE 30 MG/ML
30 INJECTION, SOLUTION INTRAMUSCULAR; INTRAVENOUS EVERY 6 HOURS PRN
Status: DISCONTINUED | OUTPATIENT
Start: 2025-03-25 | End: 2025-03-26 | Stop reason: HOSPADM

## 2025-03-25 RX ORDER — FENTANYL CITRATE 50 UG/ML
INJECTION, SOLUTION INTRAMUSCULAR; INTRAVENOUS
Status: COMPLETED | OUTPATIENT
Start: 2025-03-25 | End: 2025-03-25

## 2025-03-25 RX ORDER — LIDOCAINE HYDROCHLORIDE 10 MG/ML
INJECTION, SOLUTION EPIDURAL; INFILTRATION; INTRACAUDAL; PERINEURAL
Status: COMPLETED | OUTPATIENT
Start: 2025-03-25 | End: 2025-03-25

## 2025-03-25 RX ADMIN — LIDOCAINE HYDROCHLORIDE 5 ML: 10 INJECTION, SOLUTION EPIDURAL; INFILTRATION; INTRACAUDAL; PERINEURAL at 10:23

## 2025-03-25 RX ADMIN — KETOROLAC TROMETHAMINE 30 MG: 30 INJECTION, SOLUTION INTRAMUSCULAR at 12:08

## 2025-03-25 RX ADMIN — LIDOCAINE HYDROCHLORIDE 5 ML: 10 INJECTION, SOLUTION EPIDURAL; INFILTRATION; INTRACAUDAL; PERINEURAL at 09:57

## 2025-03-25 RX ADMIN — LIDOCAINE HYDROCHLORIDE 5 ML: 10 INJECTION, SOLUTION EPIDURAL; INFILTRATION; INTRACAUDAL; PERINEURAL at 10:15

## 2025-03-25 RX ADMIN — FENTANYL CITRATE 25 MCG: 50 INJECTION, SOLUTION INTRAMUSCULAR; INTRAVENOUS at 10:23

## 2025-03-25 RX ADMIN — FENTANYL CITRATE 25 MCG: 50 INJECTION, SOLUTION INTRAMUSCULAR; INTRAVENOUS at 09:55

## 2025-03-25 RX ADMIN — MIDAZOLAM HYDROCHLORIDE 0.5 MG: 1 INJECTION, SOLUTION INTRAMUSCULAR; INTRAVENOUS at 10:14

## 2025-03-25 RX ADMIN — FENTANYL CITRATE 50 MCG: 50 INJECTION, SOLUTION INTRAMUSCULAR; INTRAVENOUS at 09:45

## 2025-03-25 RX ADMIN — FENTANYL CITRATE 50 MCG: 50 INJECTION, SOLUTION INTRAMUSCULAR; INTRAVENOUS at 09:29

## 2025-03-25 RX ADMIN — MIDAZOLAM HYDROCHLORIDE 0.5 MG: 1 INJECTION, SOLUTION INTRAMUSCULAR; INTRAVENOUS at 10:23

## 2025-03-25 RX ADMIN — MIDAZOLAM HYDROCHLORIDE 0.5 MG: 1 INJECTION, SOLUTION INTRAMUSCULAR; INTRAVENOUS at 09:58

## 2025-03-25 RX ADMIN — MIDAZOLAM HYDROCHLORIDE 1 MG: 1 INJECTION, SOLUTION INTRAMUSCULAR; INTRAVENOUS at 09:45

## 2025-03-25 RX ADMIN — MIDAZOLAM HYDROCHLORIDE 1 MG: 1 INJECTION, SOLUTION INTRAMUSCULAR; INTRAVENOUS at 09:29

## 2025-03-25 RX ADMIN — FENTANYL CITRATE 25 MCG: 50 INJECTION, SOLUTION INTRAMUSCULAR; INTRAVENOUS at 10:12

## 2025-03-25 RX ADMIN — Medication 3 L/MIN: at 09:21

## 2025-03-25 ASSESSMENT — PAIN SCALES - GENERAL
PAINLEVEL_OUTOF10: 0 - NO PAIN
PAINLEVEL_OUTOF10: 8
PAINLEVEL_OUTOF10: 0 - NO PAIN
PAINLEVEL_OUTOF10: 5 - MODERATE PAIN
PAINLEVEL_OUTOF10: 7
PAINLEVEL_OUTOF10: 0 - NO PAIN
PAINLEVEL_OUTOF10: 8
PAINLEVEL_OUTOF10: 0 - NO PAIN
PAINLEVEL_OUTOF10: 8

## 2025-03-25 ASSESSMENT — PAIN - FUNCTIONAL ASSESSMENT
PAIN_FUNCTIONAL_ASSESSMENT: 0-10

## 2025-03-25 ASSESSMENT — PAIN DESCRIPTION - DESCRIPTORS
DESCRIPTORS: SORE

## 2025-03-25 NOTE — Clinical Note
"20cc sanguinous fluid placed in cytolyte. 5 total core samples of left neck placed in formulin labeled \"left neck lymph node\""

## 2025-03-25 NOTE — DISCHARGE INSTRUCTIONS
Verbal instructions provide, MD called in pain medication for pt to , declined written papers and ice packs to go.

## 2025-03-25 NOTE — PROCEDURES
Interventional Radiology Brief Postprocedure Note    Attending: Edy Triana MD    Assistant:   Staff Role   Luz Maria Lepe Radiology Technologist   Jorge Rosales, RN Radiology Nurse   Edy Triana MD Radiologist       Diagnosis:   1. Neck mass  US guided soft tissue biopsy    US guided soft tissue biopsy    Surgical Pathology Exam    Surgical Pathology Exam    Cytology Consultation (Non-Gynecologic)    Cytology Consultation (Non-Gynecologic)          Description of procedure: US guided soft tissue biopsy bilateral paratracheal lymph node FNA 25 G x 4, left supraclavicular mass aspiration and biopsy 18 G x 5, and right level 3/4 lymph node biopsy 18 G.     Timeout:  Yes    Procedure Area: Procedure Area     Anesthesia:   Conscious Sedation    Complications: None    Estimated Blood Loss: minimal    Medications (Filter: Administrations occurring from 0906 to 1048 on 03/25/25) As of 03/25/25 1048      oxygen (O2) therapy (L/min) Total volume:  Not documented* Dosing weight:  85.7   *Total volume has not been documented. View each administration to see the amount administered.     Date/Time Rate/Dose/Volume Action       03/25/25  0921 3 L/min - 180,000 mL/hr Start      1041  Stopped               fentaNYL PF (Sublimaze) injection (mcg) Total dose:  175 mcg      Date/Time Rate/Dose/Volume Action       03/25/25  0929 50 mcg Given      0945 50 mcg Given      0955 25 mcg Given      1012 25 mcg Given      1023 25 mcg Given               midazolam (Versed) injection (mg) Total dose:  3.5 mg      Date/Time Rate/Dose/Volume Action       03/25/25  0929 1 mg Given      0945 1 mg Given      0958 0.5 mg Given      1014 0.5 mg Given      1023 0.5 mg Given               lidocaine PF (Xylocaine) 10 mg/mL (1 %) injection (mL) Total volume:  15 mL      Date/Time Rate/Dose/Volume Action       03/25/25  0957 5 mL Given      1015 5 mL Given      1023 5 mL Given                   ID Type Source Tests Collected by Time   1  : right neck level 3-4 Tissue INGUINAL LYMPH NODE BIOPSY RIGHT SURGICAL PATHOLOGY EXAM Edy Triana MD 3/25/2025 1006   2 : right marisela trachial Non-Gynecologic Cytology LYMPH NODE (specify site) FINE NEEDLE ASPIRATION CYTOLOGY CONSULTATION (NON-GYNECOLOGIC) Edy Triana MD 3/25/2025 1008   3 : left marisela trachial Non-Gynecologic Cytology LYMPH NODE (specify site) FINE NEEDLE ASPIRATION CYTOLOGY CONSULTATION (NON-GYNECOLOGIC) Edy Triana MD 3/25/2025 1019   4 : left neck lymph node Tissue LYMPH NODE BIOPSY SURGICAL PATHOLOGY EXAM Edy Triana MD 3/25/2025 1030   5 : left neck lymph node Non-Gynecologic Cytology LYMPH NODE (specify site) FINE NEEDLE ASPIRATION CYTOLOGY CONSULTATION (NON-GYNECOLOGIC) Edy Triana MD 3/25/2025 1038         See detailed result report with images in PACS.    The patient tolerated the procedure well without incident or complication and is in stable condition.

## 2025-03-25 NOTE — Clinical Note
Patient very worked up over procedure. Long time spent conversing with patient after initial sedation. Additional administration, procedure to begin shortly

## 2025-03-25 NOTE — PRE-PROCEDURE NOTE
Interventional Radiology Preprocedure Note    Indication for procedure: The encounter diagnosis was Neck mass.    Relevant review of systems: NA    Relevant Labs:   Lab Results   Component Value Date    CREATININE 0.78 02/27/2025    EGFR 87 02/27/2025       Planned Sedation/Anesthesia: Moderate    Airway assessment: normal    Directed physical examination:    Aox3  No increased work of breathing.   No acute distress      Mallampati: II (hard and soft palate, upper portion of tonsils and uvula visible)    ASA Score: ASA 2 - Patient with mild systemic disease with no functional limitations    Benefits, risks and alternatives of procedure and planned sedation have been discussed with the patient and/or their representative. All questions answered and they agree to proceed.

## 2025-03-28 LAB
LABORATORY COMMENT REPORT: NORMAL
PATH REPORT.FINAL DX SPEC: NORMAL
PATH REPORT.GROSS SPEC: NORMAL
PATH REPORT.RELEVANT HX SPEC: NORMAL
PATH REPORT.TOTAL CANCER: NORMAL

## 2025-03-31 LAB
LABORATORY COMMENT REPORT: NORMAL
LABORATORY COMMENT REPORT: NORMAL
PATH REPORT.FINAL DX SPEC: NORMAL
PATH REPORT.GROSS SPEC: NORMAL
PATH REPORT.TOTAL CANCER: NORMAL

## 2025-04-06 DIAGNOSIS — J06.9 VIRAL URI WITH COUGH: ICD-10-CM

## 2025-04-07 RX ORDER — IBUPROFEN 800 MG/1
TABLET ORAL
Refills: 0 | OUTPATIENT
Start: 2025-04-07

## 2025-04-08 DIAGNOSIS — C79.9 METASTASIS FROM THYROID CANCER (MULTI): ICD-10-CM

## 2025-04-08 DIAGNOSIS — C73 METASTASIS FROM THYROID CANCER (MULTI): ICD-10-CM

## 2025-04-08 DIAGNOSIS — C73 PAPILLARY CARCINOMA OF THYROID: ICD-10-CM

## 2025-04-09 ENCOUNTER — APPOINTMENT (OUTPATIENT)
Facility: CLINIC | Age: 60
End: 2025-04-09
Payer: COMMERCIAL

## 2025-04-21 ENCOUNTER — TELEPHONE (OUTPATIENT)
Dept: OTOLARYNGOLOGY | Facility: CLINIC | Age: 60
End: 2025-04-21
Payer: COMMERCIAL

## 2025-04-21 NOTE — TELEPHONE ENCOUNTER
Returned call to Simi, she was wondering next steps for insurance denial. I explained that I am working on scheduling a peer to peer with Dr. Faustin and her insurance to overturn denial of her inpatient stay after surgery. Will update after peer to peer. Confirmed she does a a follow up with Dr. Faustin to discuss surgery and she is also set up for her preop testing appt at . No other questions, encouraged her to call with any other concerns.

## 2025-04-24 ENCOUNTER — APPOINTMENT (OUTPATIENT)
Facility: CLINIC | Age: 60
End: 2025-04-24
Payer: COMMERCIAL

## 2025-04-24 DIAGNOSIS — C79.9 METASTASIS FROM THYROID CANCER (MULTI): Primary | ICD-10-CM

## 2025-04-24 DIAGNOSIS — C73 METASTASIS FROM THYROID CANCER (MULTI): Primary | ICD-10-CM

## 2025-04-24 PROCEDURE — 99214 OFFICE O/P EST MOD 30 MIN: CPT | Performed by: OTOLARYNGOLOGY

## 2025-04-24 PROCEDURE — 1036F TOBACCO NON-USER: CPT | Performed by: OTOLARYNGOLOGY

## 2025-04-24 NOTE — PROGRESS NOTES
ENT Outpatient Consultation    Chief Complaint: Neck mass.  History of thyroid cancer  History Of Present Illness  Simi Jefferson is a 60 y.o. female presents for evaluation of a neck mass and a prior history of thyroid cancer.  Patient originally had an FNA and March 2021 that showed FLUS.  Molecular testing was suspicious for malignancy and she underwent total thyroidectomy in May 2021.  She received radioactive iodine in June 2021 and her posttreatment whole-body scan was negative.  She noticed a left-sided neck mass approximately 6 months ago and at first thought it was swollen gland.  She recently had a CT scan of her neck that confirmed an enlarged pathologic lymph node on the left-hand side.  She is here today for evaluation.  She has not had any change in voice or swallowing.  Denies any throat pain.  From available records that her last thyroglobulin was in 2022 and at that time was 28.  Her antibodies were negative    4/24/25: Patient returns for follow-up to discuss upcoming surgery.  Biopsy of bilateral lateral neck nodes and central neck nodes returned positive for papillary thyroid cancer.  Also have new baseline thyroglobulin of 43.9.  She remains asymptomatic     Past Medical History  She has a past medical history of Cancer (Multi) (05/2021), Personal history of other diseases of the respiratory system (07/19/2013), Personal history of other diseases of the respiratory system (05/25/2016), and Personal history of other medical treatment.    Surgical History  She has no past surgical history on file.     Social History  She reports that she quit smoking about 39 years ago. Her smoking use included cigarettes. She started smoking about 40 years ago. She has been exposed to tobacco smoke. She has never used smokeless tobacco. She reports current alcohol use of about 2.0 standard drinks of alcohol per week. She reports that she does not use drugs.    Family History  Family History   Problem Relation Name  Age of Onset    Cancer Father Estefani Crocker [place]     Cancer Maternal Grandmother Unique Quarles         Allergies  Patient has no known allergies.     Physical Exam:  CONSTITUTIONAL: No acute distress at rest.  Emotional during discussion  VOICE:  No hoarseness or other abnormality  RESPIRATION:  Breathing comfortably, no stridor  CV:  No clubbing/cyanosis/edema in hands  EYES:  EOM intact, sclera normal  NEURO:  Alert and oriented times 3, Cranial nerves II-XII grossly intact and symmetric bilaterally  HEAD AND FACE:  Symmetric facial features, no masses or lesions, sinuses non-tender to palpation  SALIVARY GLANDS:  Parotid and submandibular glands normal bilaterally  EARS:  Normal external ears, external auditory canals, and TMs to otoscopy, normal hearing to whispered voice.  NOSE:  External nose midline, anterior rhinoscopy is normal with limited visualization to the anterior aspect of the interior turbinates, no bleeding or drainage, no lesions  ORAL CAVITY/OROPHARYNX/LIPS:  Normal mucous membranes, normal floor of mouth/tongue/OP, no masses or lesions  PHARYNGEAL WALLS:  No masses or lesions  NECK/LYMPH: Palpable left-sided lymphadenopathy  SKIN: Thyroidectomy scar well-healed  PSYCH:  Alert and oriented with appropriate mood and affect         Last Recorded Vitals  There were no vitals taken for this visit.    Relevant Results      No results found for this or any previous visit (from the past 24 hours).    CT soft tissue neck w IV contrast    Result Date: 3/3/2025  Interpreted By:  Fuad Hernandez, STUDY: CT SOFT TISSUE NECK W IV CONTRAST;  3/3/2025 12:31 pm   INDICATION: Signs/Symptoms:anterior left neck mass, Sternoclidomastoid mid neck..   ,R22.1 Localized swelling, mass and lump, neck   COMPARISON: None.   ACCESSION NUMBER(S): [id number]   ORDERING CLINICIAN: LILLIANA TATE   TECHNIQUE: Axial CT images of the neck were obtained.  The patient received 68 ML of Omnipaque 350 intravenous contrast agent. The  images were reformatted in angled axial, coronal and sagittal planes.   FINDINGS: The left neck has a cystic mass lateral to and compressing the left jugular vein at the level of the carotid bifurcation with a large lobulated diffusely enhancing mass within the superior aspect of the cyst.   The entire mass is 3.4 x 3.6 x 6.9 cm. The diffusely enhancing lobulated mass superiorly is 2.4 x 3.7 x 3.1 cm.   The more inferior cystic portion has smooth thin rim extending inferiorly to the level of the thyroid gland. No connection with the thyroid gland is noted. The mass remains lateral to the jugular vein.   Oral Cavity, Pharynx and Larynx:  Evaluation oral cavity is limited by streak artifact from dental hardware.  The nasopharyngeal and oropharyngeal structures are unremarkable. The hypopharyngeal and laryngeal structures are unremarkable.   Retropharyngeal and Prevertebral Soft Tissues: Unremarkable.   Lymph nodes: There are few non specific bilateral neck nodes, probably reactive in etiology.   Neck vessels: Bilateral neck vessels are normal in course and caliber and appear patent.   Thyroid gland: The thyroid gland is unremarkable in size and appearance.   Parotid and submandibular glands: Bilateral parotid and submandibular glands are unremarkable in appearance.   Paranasal Sinuses and Mastoids: Visualized paranasal sinuses and bilateral mastoids are clear.   Visualized orbital structures are unremarkable.   Visualized upper lungs are clear.   Cervical spine has degenerative disc changes with vacuum phenomena and loss of disc height and mild endplate spurring.       The left neck has a partially cystic mass coursing along the lateral aspect of the jugular vein with a lobulated diffusely enhancing solid component superiorly. No associated adenopathy is noted.     MACRO: None   Signed by: Fuad Hernandez 3/3/2025 5:12 PM Dictation workstation:   [id number]      CT scan was personally reviewed.  This shows left-sided  lymphadenopathy.  There is also some concern for lymphadenopathy in the central and right neck that is smaller in size    Assessment and Plan  60 y.o. female with history of thyroid cancer that was treated with surgery and radioactive iodine in 2021.  Patient presents today with lymphadenopathy and concern for possible metastatic thyroid cancer.  Patient is very anxious regarding FNA and would like to have it done under sedation.  She also has multiple sites that are concerning on the CT scan.  She was sent for FNA of bilateral neck nodes in the central and lateral neck all of which returned as positive    - Discussed bilateral neck dissections to include central lateral neck.  We discussed risks of bleeding, infection, numbness, cranial neuropathies, wound complications, chyle leak, hypoparathyroidism.  We discussed some of the intraoperative decision making regarding nerve sacrifice and short and long-term rehab.  We discussed expected hospital stay, drain placement, and other postoperative expectations.  All questions were answered    Kelvin Faustin MD     details…

## 2025-05-05 ENCOUNTER — LAB (OUTPATIENT)
Dept: LAB | Facility: HOSPITAL | Age: 60
End: 2025-05-05
Payer: COMMERCIAL

## 2025-05-05 ENCOUNTER — PRE-ADMISSION TESTING (OUTPATIENT)
Dept: PREADMISSION TESTING | Facility: HOSPITAL | Age: 60
End: 2025-05-05
Payer: COMMERCIAL

## 2025-05-05 VITALS
WEIGHT: 177.03 LBS | HEART RATE: 83 BPM | SYSTOLIC BLOOD PRESSURE: 133 MMHG | RESPIRATION RATE: 16 BRPM | DIASTOLIC BLOOD PRESSURE: 73 MMHG | OXYGEN SATURATION: 95 % | BODY MASS INDEX: 26.22 KG/M2 | HEIGHT: 69 IN | TEMPERATURE: 97.3 F

## 2025-05-05 DIAGNOSIS — Z01.818 PREOP EXAMINATION: Primary | ICD-10-CM

## 2025-05-05 DIAGNOSIS — C73 MALIGNANT NEOPLASM OF THYROID GLAND (MULTI): ICD-10-CM

## 2025-05-05 DIAGNOSIS — C73 METASTASIS FROM THYROID CANCER (MULTI): ICD-10-CM

## 2025-05-05 DIAGNOSIS — C79.9 METASTASIS FROM THYROID CANCER (MULTI): ICD-10-CM

## 2025-05-05 DIAGNOSIS — Z01.818 ENCOUNTER FOR OTHER PREPROCEDURAL EXAMINATION: Primary | ICD-10-CM

## 2025-05-05 DIAGNOSIS — C73 PAPILLARY CARCINOMA OF THYROID: ICD-10-CM

## 2025-05-05 DIAGNOSIS — C79.9 SECONDARY MALIGNANT NEOPLASM OF UNSPECIFIED SITE (CODE): ICD-10-CM

## 2025-05-05 LAB
ANION GAP SERPL CALC-SCNC: 16 MMOL/L (ref 10–20)
BUN SERPL-MCNC: 12 MG/DL (ref 6–23)
CALCIUM SERPL-MCNC: 10.1 MG/DL (ref 8.6–10.3)
CHLORIDE SERPL-SCNC: 98 MMOL/L (ref 98–107)
CO2 SERPL-SCNC: 30 MMOL/L (ref 21–32)
CREAT SERPL-MCNC: 0.68 MG/DL (ref 0.5–1.05)
EGFRCR SERPLBLD CKD-EPI 2021: >90 ML/MIN/1.73M*2
ERYTHROCYTE [DISTWIDTH] IN BLOOD BY AUTOMATED COUNT: 14.5 % (ref 11.5–14.5)
GLUCOSE SERPL-MCNC: 69 MG/DL (ref 74–99)
HCT VFR BLD AUTO: 43.8 % (ref 36–46)
HGB BLD-MCNC: 14.1 G/DL (ref 12–16)
MCH RBC QN AUTO: 27.9 PG (ref 26–34)
MCHC RBC AUTO-ENTMCNC: 32.2 G/DL (ref 32–36)
MCV RBC AUTO: 87 FL (ref 80–100)
NRBC BLD-RTO: 0 /100 WBCS (ref 0–0)
PLATELET # BLD AUTO: 279 X10*3/UL (ref 150–450)
POTASSIUM SERPL-SCNC: 4.2 MMOL/L (ref 3.5–5.3)
RBC # BLD AUTO: 5.06 X10*6/UL (ref 4–5.2)
SODIUM SERPL-SCNC: 140 MMOL/L (ref 136–145)
WBC # BLD AUTO: 6.4 X10*3/UL (ref 4.4–11.3)

## 2025-05-05 PROCEDURE — 36415 COLL VENOUS BLD VENIPUNCTURE: CPT

## 2025-05-05 PROCEDURE — 80048 BASIC METABOLIC PNL TOTAL CA: CPT

## 2025-05-05 PROCEDURE — 85027 COMPLETE CBC AUTOMATED: CPT

## 2025-05-05 PROCEDURE — 99202 OFFICE O/P NEW SF 15 MIN: CPT | Performed by: NURSE PRACTITIONER

## 2025-05-05 ASSESSMENT — DUKE ACTIVITY SCORE INDEX (DASI)
TOTAL_SCORE: 58.2
CAN YOU DO MODERATE WORK AROUND THE HOUSE LIKE VACUUMING, SWEEPING FLOORS OR CARRYING GROCERIES: YES
CAN YOU TAKE CARE OF YOURSELF (EAT, DRESS, BATHE, OR USE TOILET): YES
CAN YOU HAVE SEXUAL RELATIONS: YES
CAN YOU DO HEAVY WORK AROUND THE HOUSE LIKE SCRUBBING FLOORS OR LIFTING AND MOVING HEAVY FURNITURE: YES
DASI METS SCORE: 9.9
CAN YOU CLIMB A FLIGHT OF STAIRS OR WALK UP A HILL: YES
CAN YOU PARTICIPATE IN MODERATE RECREATIONAL ACTIVITIES LIKE GOLF, BOWLING, DANCING, DOUBLES TENNIS OR THROWING A BASEBALL OR FOOTBALL: YES
CAN YOU WALK A BLOCK OR TWO ON LEVEL GROUND: YES
CAN YOU DO LIGHT WORK AROUND THE HOUSE LIKE DUSTING OR WASHING DISHES: YES
CAN YOU DO YARD WORK LIKE RAKING LEAVES, WEEDING OR PUSHING A MOWER: YES
CAN YOU PARTICIPATE IN STRENOUS SPORTS LIKE SWIMMING, SINGLES TENNIS, FOOTBALL, BASKETBALL, OR SKIING: YES
CAN YOU RUN A SHORT DISTANCE: YES
CAN YOU WALK INDOORS, SUCH AS AROUND YOUR HOUSE: YES

## 2025-05-05 ASSESSMENT — LIFESTYLE VARIABLES: SMOKING_STATUS: NONSMOKER

## 2025-05-05 ASSESSMENT — PAIN - FUNCTIONAL ASSESSMENT: PAIN_FUNCTIONAL_ASSESSMENT: 0-10

## 2025-05-05 ASSESSMENT — ACTIVITIES OF DAILY LIVING (ADL): ADL_SCORE: 0

## 2025-05-05 NOTE — H&P (VIEW-ONLY)
CPM/PAT Evaluation       Name: Simi Jefferson (Simi Jefferson)  /Age: 1965/60 y.o.     In-Person       Chief Complaint: pre op appointment for upcoming surgery    HPI    RT is a 61 yo female who has history of thyroid cancer s/p total thyroidectomy with radiation back in . More recently she presented to PCP for flu like symptoms when a large anterior neck mass on left side was identified. Patient has stated that she noticed difference in this mass over 6 months. She was sent for imaging with biopsy which confirmed lymph node malignancy. Treatment options discussed with ENT and subsequently she is scheduled for bilateral neck dissection surgery. Presents to [facility] today for perioperative risk stratification and optimization. She denies any issues with chewing or swallowing. Denies any pain from the mass or neck region. Otherwise denies any recent fever/chills, chest pains or shortness of breath.     Medical History[1]    Surgical History[2]    Patient  reports being sexually active and has had partner(s) who are male. She reports using the following method of birth control/protection: None.    Family History[3]    Allergies[4]    Prior to Admission medications    Medication Sig Start Date End Date Taking? Authorizing Provider   atorvastatin (Lipitor) 20 mg tablet Take 1 tablet (20 mg) by mouth once daily. 25  Tano Pretty MD   ibuprofen (IBU) 800 mg tablet Take 1 tablet (800 mg) by mouth 3 times a day as needed for mild pain (1 - 3) or moderate pain (4 - 6). take wtih food 25   Tano Pretty MD   levothyroxine (Synthroid, Levoxyl) 137 mcg tablet 1 pill daily for 6 days then 1.5 on 25   Tano Pretty MD   tirzepatide, weight loss, 2.5 mg/0.5 mL solution Inject 2.5 mg under the skin every 7 days.    Historical Provider, MD   venlafaxine XR (Effexor-XR) 37.5 mg 24 hr capsule Take 1 capsule (37.5 mg) by mouth once daily. Do not crush or chew. 25   Tano Pretty MD         ROS  Constitutional: Negative for fever, chills, or sweats   ENMT: Negative for nasal discharge, congestion, ear pain, mouth pain, throat pain +left sided neck mass  Respiratory: Negative for cough, wheezing, shortness of breath   Cardiac: Negative for chest pain, dyspnea on exertion, palpitations   Gastrointestinal: Negative for nausea, vomiting, diarrhea, constipation, abdominal pain  Genitourinary: Negative for dysuria, flank pain, frequency, hematuria   Musculoskeletal: Negative for decreased ROM, pain, swelling, weakness   Neurological: Negative for dizziness, confusion, headache  Psychiatric: Negative for mood changes   Skin: Negative for itching, rash, ulcer    Hematologic/Lymph: Negative for bruising, easy bleeding  Allergic/Immunologic: Negative itching, sneezing, swelling      Physical Exam  Constitutional:       Appearance: Normal appearance.   HENT:      Head: Normocephalic.      Mouth/Throat:      Mouth: Mucous membranes are moist.   Eyes:      Extraocular Movements: Extraocular movements intact.   Neck:      Comments: Left sided mass- anterior neck  Cardiovascular:      Rate and Rhythm: Normal rate and regular rhythm.   Pulmonary:      Effort: Pulmonary effort is normal.      Breath sounds: Normal breath sounds.   Abdominal:      General: Abdomen is flat.      Palpations: Abdomen is soft.   Musculoskeletal:         General: Normal range of motion.      Cervical back: Normal range of motion.   Lymphadenopathy:      Cervical: Cervical adenopathy (left side) present.   Skin:     General: Skin is warm and dry.   Neurological:      General: No focal deficit present.      Mental Status: She is alert.   Psychiatric:         Mood and Affect: Mood normal.        PAT AIRWAY:   Airway:     Mallampati::  II    Neck ROM::  Full  normal        Testing/Diagnostic:     Lab Results   Component Value Date    WBC 7.8 05/23/2024    HGB 14.4 05/23/2024    HCT 43.9 05/23/2024    MCV 86 05/23/2024     05/23/2024  "    Lab Results   Component Value Date    GLUCOSE 81 02/27/2025    CALCIUM 10.0 02/27/2025     02/27/2025    K 3.9 02/27/2025    CO2 27 02/27/2025     02/27/2025    BUN 12 02/27/2025    CREATININE 0.78 02/27/2025     Patient Specialist/PCP: Dr. Pretty  ENT: Dr. Faustin     Visit Vitals  /73   Pulse 83   Temp 36.3 °C (97.3 °F) (Temporal)   Resp 16   Ht 1.74 m (5' 8.5\")   Wt 80.3 kg (177 lb 0.5 oz)   SpO2 95%   BMI 26.53 kg/m²   OB Status Postmenopausal   Smoking Status Former   BSA 1.97 m²       DASI Risk Score      Flowsheet Row Pre-Admission Testing from 5/5/2025 in [facility] Questionnaire Series Submission from 4/8/2025 in [facility] with Generic Provider Shubham   Can you take care of yourself (eat, dress, bathe, or use toilet)?  2.75 filed at 05/05/2025 1056 2.75  filed at 04/08/2025 0935   Can you walk indoors, such as around your house? 1.75 filed at 05/05/2025 1056 1.75  filed at 04/08/2025 0935   Can you walk a block or two on level ground?  2.75 filed at 05/05/2025 1056 2.75  filed at 04/08/2025 0935   Can you climb a flight of stairs or walk up a hill? 5.5 filed at 05/05/2025 1056 5.5  filed at 04/08/2025 0935   Can you run a short distance? 8 filed at 05/05/2025 1056 8  filed at 04/08/2025 0935   Can you do light work around the house like dusting or washing dishes? 2.7 filed at 05/05/2025 1056 2.7  filed at 04/08/2025 0935   Can you do moderate work around the house like vacuuming, sweeping floors or carrying groceries? 3.5 filed at 05/05/2025 1056 3.5  filed at 04/08/2025 0935   Can you do heavy work around the house like scrubbing floors or lifting and moving heavy furniture?  8 filed at 05/05/2025 1056 8  filed at 04/08/2025 0935   Can you do yard work like raking leaves, weeding or pushing a mower? 4.5 filed at 05/05/2025 1056 4.5  filed at 04/08/2025 0935   Can you have sexual relations? 5.25 filed at 05/05/2025 1056 5.25  filed at 04/08/2025 0935   Can you " participate in moderate recreational activities like golf, bowling, dancing, doubles tennis or throwing a baseball or football? 6 filed at 05/05/2025 1056 6  filed at 04/08/2025 0935   Can you participate in strenous sports like swimming, singles tennis, football, basketball, or skiing? 7.5 filed at 05/05/2025 1056 7.5  filed at 04/08/2025 0935   DASI SCORE 58.2 filed at 05/05/2025 1056 58.2  filed at 04/08/2025 0935   METS Score (Will be calculated only when all the questions are answered) 9.9 filed at 05/05/2025 1056 9.9  filed at 04/08/2025 0935          Caprini DVT Assessment      Flowsheet Row Pre-Admission Testing from 5/5/2025 in [facility]   DVT Score (IF A SCORE IS NOT CALCULATING, MUST SELECT A BMI TO COMPLETE) 9 filed at 05/05/2025 1055   Medical Factors Present cancer, chemotherapy, or previous malignancy filed at 05/05/2025 1055   Surgical Factors Major surgery planned, lasting over 3 hours filed at 05/05/2025 1055   BMI (BMI MUST BE CHOSEN) 30 or less filed at 05/05/2025 1055          Modified Frailty Index      Flowsheet Row Pre-Admission Testing from 5/5/2025 in [facility]   Non-independent functional status (problems with dressing, bathing, personal grooming, or cooking) 0 filed at 05/05/2025 1057   History of diabetes mellitus  0 filed at 05/05/2025 1057   History of COPD 0 filed at 05/05/2025 1057   History of CHF No filed at 05/05/2025 1057   History of MI 0 filed at 05/05/2025 1057   History of Percutaneous Coronary Intervention, Cardiac Surgery, or Angina No filed at 05/05/2025 1057   Hypertension requiring the use of medication  0 filed at 05/05/2025 1057   Peripheral vascular disease 0 filed at 05/05/2025 1057   Impaired sensorium (cognitive impairement or loss, clouding, or delirium) 0 filed at 05/05/2025 1057   TIA or CVA withouy residual deficit 0 filed at 05/05/2025 1057   Cerebrovascular accident with deficit 0 filed at 05/05/2025 1057   Modified Frailty  Index Calculator 0 filed at 05/05/2025 1057          [id number] Stroke Risk Points  Current as of just now        N/A 0 to 9 Points:      Last Change: N/A          The [id number] risk score (Lip GH, et al. 2009. © 2010 [employer]) quantifies the risk of stroke for a patient with atrial fibrillation. For patients without atrial fibrillation or under the age of 18 this score appears as N/A. Higher score values generally indicate higher risk of stroke.        This score is not applicable to this patient. Components are not calculated.          Revised Cardiac Risk Index      Flowsheet Row Pre-Admission Testing from 5/5/2025 in [facility]   High-Risk Surgery (Intraperitoneal, Intrathoracic,Suprainguinal vascular) 0 filed at 05/05/2025 1056   History of congestive heart failure (pulmonary edemia, bilateral rales or S3 gallop, Paroxysmal nocturnal dyspnea, CXR showing pulmonary vascular redistribution) 0 filed at 05/05/2025 1056   History of cerebrovascular disease (Prior TIA or stroke) 0 filed at 05/05/2025 1056   Pre-operative insulin treatment 0 filed at 05/05/2025 1056   Pre-operative creatinine>2 mg/dl 0 filed at 05/05/2025 1056          Apfel Simplified Score      Flowsheet Row Pre-Admission Testing from 5/5/2025 in [facility]   Smoking status 1 filed at 05/05/2025 1056   History of motion sickness or PONV  0 filed at 05/05/2025 1056   Use of postoperative opioids 1 filed at 05/05/2025 1056   Gender - Female 1=Yes filed at 05/05/2025 1056   Apfel Simplified Score Calculator 3 filed at 05/05/2025 1056          Risk Analysis Index Results This Encounter         5/5/2025  1057             Do you live in a place other than your own home?: 0    When did you begin living in the place you are currently residing?: Greater than one year ago    Any kidney failure, kidney not working well, or seeing a kidney doctor (nephrologist)? If yes, was this for kidney stones  or another problem?: 0 No    Any history of chronic (long-term) congestive heart failure (CHF)?: 0 No    Any shortness of breath when resting?: 0 No    In the past five years, have you been diagnosed with or treated for cancer?: Yes    During the last 3 months has it become difficult for you to remember things or organize your thoughts?: 0 No    Have you lost weight of 10 pounds or more in the past 3 months without trying?: 0 No    Do you have any loss of appetitie?: 0 No    Getting Around (Mobility): 0 Can get around without help    Eatin Can plan and prepare own meals    Toiletin Can use toilet without any help    Personal Hygiene (Bathing, Hand Washing, Changing Clothes): 0 Can shower or bathe without any help    GOODEN Cancer History: Patient indicates history of cancer    Total Risk Analysis Index Score Without Cancer: 16    Total Risk Analysis Index Score: 32          Stop Bang Score      Flowsheet Row Pre-Admission Testing from 2025 in [facility] Questionnaire Series Submission from 2025 in [facility] with Generic Provider Shubham   Do you snore loudly? 0 filed at 2025 1056 0  filed at 2025 0935   Do you often feel tired or fatigued after your sleep? 0 filed at 2025 1056 0  filed at 2025 0935   Has anyone ever observed you stop breathing in your sleep? 0 filed at 2025 1056 0  filed at 2025 0935   Do you have or are you being treated for high blood pressure? 0 filed at 2025 1056 0  filed at 2025 0935   Recent BMI (Calculated) 29.7 filed at 2025 1056 29.7  filed at 2025 0935   Is BMI greater than 35 kg/m2? 0=No filed at 2025 105 0=No  filed at 2025 09   Age older than 50 years old? 1=Yes filed at 2025 1=Yes  filed at 2025 09   Is your neck circumference greater than 17 inches (Male) or 16 inches (Female)? 0 filed at 2025 --   Gender - Male 0=No filed at 2025 0=No   filed at 04/08/2025 0935   STOP-BANG Total Score 1 filed at 05/05/2025 1056 --          Prodigy: High Risk  Total Score: 0          ARISCAT Score for Postoperative Pulmonary Complications      Flowsheet Row Pre-Admission Testing from 5/5/2025 in [facility]   Age Calculated Score 3 filed at 05/05/2025 1057   Preoperative SpO2 0 filed at 05/05/2025 1057   Respiratory infection in the last month Either upper or lower (i.e., URI, bronchitis, pneumonia), with fever and antibiotic treatment 0 filed at 05/05/2025 1057   Preoperative anemia (Hgb less than 10 g/dl) 0 filed at 05/05/2025 1057   Surgical incision  0 filed at 05/05/2025 1057   Duration of surgery  23 filed at 05/05/2025 1057   Emergency Procedure  0 filed at 05/05/2025 1057   ARISCAT Total Score  26 filed at 05/05/2025 1057          Annamarie Perioperative Risk for Myocardial Infarction or Cardiac Arrest (BRODIE)      Flowsheet Row Pre-Admission Testing from 5/5/2025 in [facility]   Calculated Age Score 1.18 filed at 05/05/2025 1058   Functional Status  0 filed at 05/05/2025 1058   ASA Class  -3.29 filed at 05/05/2025 1058   Creatinine 0 filed at 05/05/2025 1058   Type of Procedure  0.18 filed at 05/05/2025 1058   BRODIE Total Score  -7.18 filed at 05/05/2025 1058   BRODIE % 0.08 filed at 05/05/2025 1058            Assessment and Plan:     Pre-Op  59 yo female scheduled for bilateral neck dissection for 5/16/2025 with Dr. Faustin. Blood work ordered. Otherwise no further orders indicated.     Cardiac  Hyperlipidemia, taking statin  DASI Score: 58.2   MET Score: 9.9  RCRI  0 which is 3.9% 30 day risk of MACE (risk for cardiac death, nonfatal myocardial infarction, and nonfactal cardiac arrest)  BRODIE score which indicates a  0.08 % risk of intraoperative or 30-day postoperative MACE    Pulmonary  No diagnosis or significant findings on chart review or clinical presentation and evaluation.    Preoperative deep breathing educational handout  provided to patient.  STOP BAN  points which is a low risk for moderate to severe JACINTO  ARISCAT:   26   points which is a low (1.6%) risk of in-hospital post-op pulmonary complications     Endocrine  Papillary carcinoma of thyroid, reason for surgery  Metastasis from thyroid cancer, s/p total thyroidectomy in     GI  Apfel: 3 points 61% risk for post operative N/V    /Renal  Counseled on avoiding NSAIDs, adequate hydration    Hematologic  No hematological medical history, however due to high Caprini score of  9 patient is at HIGH risk for perioperative DVT, informative education handout provided    Skin check: Patient was instructed to make surgeon aware of any skin changes/concerns prior to surgery.     Anesthesia:  Patient denies any anesthesia complications.     See risk scores as previously documented.            [1]   Past Medical History:  Diagnosis Date    Cancer (Multi) 2021    Disease of thyroid gland May 2022    Personal history of other diseases of the respiratory system 2013    History of bronchitis    Personal history of other diseases of the respiratory system 2016    History of acute pharyngitis    Personal history of other medical treatment     History of screening mammography   [2]   Past Surgical History:  Procedure Laterality Date    MYOMECTOMY      THYROID SURGERY  May 2022   [3]   Family History  Problem Relation Name Age of Onset    Hypertension Mother      Cancer Father [facility]     Cancer Maternal Grandmother Unique Quarles    [4] No Known Allergies

## 2025-05-05 NOTE — CPM/PAT H&P
CPM/PAT Evaluation       Name: Simi Jefferson (Simi Jefferson)  /Age: 1965/60 y.o.     In-Person       Chief Complaint: pre op appointment for upcoming surgery    HPI    RT is a 61 yo female who has history of thyroid cancer s/p total thyroidectomy with radiation back in . More recently she presented to PCP for flu like symptoms when a large anterior neck mass on left side was identified. Patient has stated that she noticed difference in this mass over 6 months. She was sent for imaging with biopsy which confirmed lymph node malignancy. Treatment options discussed with ENT and subsequently she is scheduled for bilateral neck dissection surgery. Presents to [facility] today for perioperative risk stratification and optimization. She denies any issues with chewing or swallowing. Denies any pain from the mass or neck region. Otherwise denies any recent fever/chills, chest pains or shortness of breath.     Medical History[1]    Surgical History[2]    Patient  reports being sexually active and has had partner(s) who are male. She reports using the following method of birth control/protection: None.    Family History[3]    Allergies[4]    Prior to Admission medications    Medication Sig Start Date End Date Taking? Authorizing Provider   atorvastatin (Lipitor) 20 mg tablet Take 1 tablet (20 mg) by mouth once daily. 25  Tano rPetty MD   ibuprofen (IBU) 800 mg tablet Take 1 tablet (800 mg) by mouth 3 times a day as needed for mild pain (1 - 3) or moderate pain (4 - 6). take wtih food 25   Tano Pretty MD   levothyroxine (Synthroid, Levoxyl) 137 mcg tablet 1 pill daily for 6 days then 1.5 on 25   Tano Pretty MD   tirzepatide, weight loss, 2.5 mg/0.5 mL solution Inject 2.5 mg under the skin every 7 days.    Historical Provider, MD   venlafaxine XR (Effexor-XR) 37.5 mg 24 hr capsule Take 1 capsule (37.5 mg) by mouth once daily. Do not crush or chew. 25   Tano Pretty MD         ROS  Constitutional: Negative for fever, chills, or sweats   ENMT: Negative for nasal discharge, congestion, ear pain, mouth pain, throat pain +left sided neck mass  Respiratory: Negative for cough, wheezing, shortness of breath   Cardiac: Negative for chest pain, dyspnea on exertion, palpitations   Gastrointestinal: Negative for nausea, vomiting, diarrhea, constipation, abdominal pain  Genitourinary: Negative for dysuria, flank pain, frequency, hematuria   Musculoskeletal: Negative for decreased ROM, pain, swelling, weakness   Neurological: Negative for dizziness, confusion, headache  Psychiatric: Negative for mood changes   Skin: Negative for itching, rash, ulcer    Hematologic/Lymph: Negative for bruising, easy bleeding  Allergic/Immunologic: Negative itching, sneezing, swelling      Physical Exam  Constitutional:       Appearance: Normal appearance.   HENT:      Head: Normocephalic.      Mouth/Throat:      Mouth: Mucous membranes are moist.   Eyes:      Extraocular Movements: Extraocular movements intact.   Neck:      Comments: Left sided mass- anterior neck  Cardiovascular:      Rate and Rhythm: Normal rate and regular rhythm.   Pulmonary:      Effort: Pulmonary effort is normal.      Breath sounds: Normal breath sounds.   Abdominal:      General: Abdomen is flat.      Palpations: Abdomen is soft.   Musculoskeletal:         General: Normal range of motion.      Cervical back: Normal range of motion.   Lymphadenopathy:      Cervical: Cervical adenopathy (left side) present.   Skin:     General: Skin is warm and dry.   Neurological:      General: No focal deficit present.      Mental Status: She is alert.   Psychiatric:         Mood and Affect: Mood normal.        PAT AIRWAY:   Airway:     Mallampati::  II    Neck ROM::  Full  normal        Testing/Diagnostic:     Lab Results   Component Value Date    WBC 7.8 05/23/2024    HGB 14.4 05/23/2024    HCT 43.9 05/23/2024    MCV 86 05/23/2024     05/23/2024  "    Lab Results   Component Value Date    GLUCOSE 81 02/27/2025    CALCIUM 10.0 02/27/2025     02/27/2025    K 3.9 02/27/2025    CO2 27 02/27/2025     02/27/2025    BUN 12 02/27/2025    CREATININE 0.78 02/27/2025     Patient Specialist/PCP: Dr. Pretty  ENT: Dr. Faustin     Visit Vitals  /73   Pulse 83   Temp 36.3 °C (97.3 °F) (Temporal)   Resp 16   Ht 1.74 m (5' 8.5\")   Wt 80.3 kg (177 lb 0.5 oz)   SpO2 95%   BMI 26.53 kg/m²   OB Status Postmenopausal   Smoking Status Former   BSA 1.97 m²       DASI Risk Score      Flowsheet Row Pre-Admission Testing from 5/5/2025 in [facility] Questionnaire Series Submission from 4/8/2025 in [facility] with Generic Provider Shubham   Can you take care of yourself (eat, dress, bathe, or use toilet)?  2.75 filed at 05/05/2025 1056 2.75  filed at 04/08/2025 0935   Can you walk indoors, such as around your house? 1.75 filed at 05/05/2025 1056 1.75  filed at 04/08/2025 0935   Can you walk a block or two on level ground?  2.75 filed at 05/05/2025 1056 2.75  filed at 04/08/2025 0935   Can you climb a flight of stairs or walk up a hill? 5.5 filed at 05/05/2025 1056 5.5  filed at 04/08/2025 0935   Can you run a short distance? 8 filed at 05/05/2025 1056 8  filed at 04/08/2025 0935   Can you do light work around the house like dusting or washing dishes? 2.7 filed at 05/05/2025 1056 2.7  filed at 04/08/2025 0935   Can you do moderate work around the house like vacuuming, sweeping floors or carrying groceries? 3.5 filed at 05/05/2025 1056 3.5  filed at 04/08/2025 0935   Can you do heavy work around the house like scrubbing floors or lifting and moving heavy furniture?  8 filed at 05/05/2025 1056 8  filed at 04/08/2025 0935   Can you do yard work like raking leaves, weeding or pushing a mower? 4.5 filed at 05/05/2025 1056 4.5  filed at 04/08/2025 0935   Can you have sexual relations? 5.25 filed at 05/05/2025 1056 5.25  filed at 04/08/2025 0935   Can you " participate in moderate recreational activities like golf, bowling, dancing, doubles tennis or throwing a baseball or football? 6 filed at 05/05/2025 1056 6  filed at 04/08/2025 0935   Can you participate in strenous sports like swimming, singles tennis, football, basketball, or skiing? 7.5 filed at 05/05/2025 1056 7.5  filed at 04/08/2025 0935   DASI SCORE 58.2 filed at 05/05/2025 1056 58.2  filed at 04/08/2025 0935   METS Score (Will be calculated only when all the questions are answered) 9.9 filed at 05/05/2025 1056 9.9  filed at 04/08/2025 0935          Caprini DVT Assessment      Flowsheet Row Pre-Admission Testing from 5/5/2025 in [facility]   DVT Score (IF A SCORE IS NOT CALCULATING, MUST SELECT A BMI TO COMPLETE) 9 filed at 05/05/2025 1055   Medical Factors Present cancer, chemotherapy, or previous malignancy filed at 05/05/2025 1055   Surgical Factors Major surgery planned, lasting over 3 hours filed at 05/05/2025 1055   BMI (BMI MUST BE CHOSEN) 30 or less filed at 05/05/2025 1055          Modified Frailty Index      Flowsheet Row Pre-Admission Testing from 5/5/2025 in [facility]   Non-independent functional status (problems with dressing, bathing, personal grooming, or cooking) 0 filed at 05/05/2025 1057   History of diabetes mellitus  0 filed at 05/05/2025 1057   History of COPD 0 filed at 05/05/2025 1057   History of CHF No filed at 05/05/2025 1057   History of MI 0 filed at 05/05/2025 1057   History of Percutaneous Coronary Intervention, Cardiac Surgery, or Angina No filed at 05/05/2025 1057   Hypertension requiring the use of medication  0 filed at 05/05/2025 1057   Peripheral vascular disease 0 filed at 05/05/2025 1057   Impaired sensorium (cognitive impairement or loss, clouding, or delirium) 0 filed at 05/05/2025 1057   TIA or CVA withouy residual deficit 0 filed at 05/05/2025 1057   Cerebrovascular accident with deficit 0 filed at 05/05/2025 1057   Modified Frailty  Index Calculator 0 filed at 05/05/2025 1057          [id number] Stroke Risk Points  Current as of just now        N/A 0 to 9 Points:      Last Change: N/A          The [id number] risk score (Lip GH, et al. 2009. © 2010 American [employer]) quantifies the risk of stroke for a patient with atrial fibrillation. For patients without atrial fibrillation or under the age of 18 this score appears as N/A. Higher score values generally indicate higher risk of stroke.        This score is not applicable to this patient. Components are not calculated.          Revised Cardiac Risk Index      Flowsheet Row Pre-Admission Testing from 5/5/2025 in [facility]   High-Risk Surgery (Intraperitoneal, Intrathoracic,Suprainguinal vascular) 0 filed at 05/05/2025 1056   History of congestive heart failure (pulmonary edemia, bilateral rales or S3 gallop, Paroxysmal nocturnal dyspnea, CXR showing pulmonary vascular redistribution) 0 filed at 05/05/2025 1056   History of cerebrovascular disease (Prior TIA or stroke) 0 filed at 05/05/2025 1056   Pre-operative insulin treatment 0 filed at 05/05/2025 1056   Pre-operative creatinine>2 mg/dl 0 filed at 05/05/2025 1056          Apfel Simplified Score      Flowsheet Row Pre-Admission Testing from 5/5/2025 in [facility]   Smoking status 1 filed at 05/05/2025 1056   History of motion sickness or PONV  0 filed at 05/05/2025 1056   Use of postoperative opioids 1 filed at 05/05/2025 1056   Gender - Female 1=Yes filed at 05/05/2025 1056   Apfel Simplified Score Calculator 3 filed at 05/05/2025 1056          Risk Analysis Index Results This Encounter         5/5/2025  1057             Do you live in a place other than your own home?: 0    When did you begin living in the place you are currently residing?: Greater than one year ago    Any kidney failure, kidney not working well, or seeing a kidney doctor (nephrologist)? If yes, was this for kidney stones  or another problem?: 0 No    Any history of chronic (long-term) congestive heart failure (CHF)?: 0 No    Any shortness of breath when resting?: 0 No    In the past five years, have you been diagnosed with or treated for cancer?: Yes    During the last 3 months has it become difficult for you to remember things or organize your thoughts?: 0 No    Have you lost weight of 10 pounds or more in the past 3 months without trying?: 0 No    Do you have any loss of appetitie?: 0 No    Getting Around (Mobility): 0 Can get around without help    Eatin Can plan and prepare own meals    Toiletin Can use toilet without any help    Personal Hygiene (Bathing, Hand Washing, Changing Clothes): 0 Can shower or bathe without any help    GOODEN Cancer History: Patient indicates history of cancer    Total Risk Analysis Index Score Without Cancer: 16    Total Risk Analysis Index Score: 32          Stop Bang Score      Flowsheet Row Pre-Admission Testing from 2025 in [facility] Questionnaire Series Submission from 2025 in [facility] with Generic Provider Shubham   Do you snore loudly? 0 filed at 2025 1056 0  filed at 2025 0935   Do you often feel tired or fatigued after your sleep? 0 filed at 2025 1056 0  filed at 2025 0935   Has anyone ever observed you stop breathing in your sleep? 0 filed at 2025 1056 0  filed at 2025 0935   Do you have or are you being treated for high blood pressure? 0 filed at 2025 1056 0  filed at 2025 0935   Recent BMI (Calculated) 29.7 filed at 2025 1056 29.7  filed at 2025 0935   Is BMI greater than 35 kg/m2? 0=No filed at 2025 105 0=No  filed at 2025 09   Age older than 50 years old? 1=Yes filed at 2025 1=Yes  filed at 2025 09   Is your neck circumference greater than 17 inches (Male) or 16 inches (Female)? 0 filed at 2025 --   Gender - Male 0=No filed at 2025 0=No   filed at 04/08/2025 0935   STOP-BANG Total Score 1 filed at 05/05/2025 1056 --          Prodigy: High Risk  Total Score: 0          ARISCAT Score for Postoperative Pulmonary Complications      Flowsheet Row Pre-Admission Testing from 5/5/2025 in [facility]   Age Calculated Score 3 filed at 05/05/2025 1057   Preoperative SpO2 0 filed at 05/05/2025 1057   Respiratory infection in the last month Either upper or lower (i.e., URI, bronchitis, pneumonia), with fever and antibiotic treatment 0 filed at 05/05/2025 1057   Preoperative anemia (Hgb less than 10 g/dl) 0 filed at 05/05/2025 1057   Surgical incision  0 filed at 05/05/2025 1057   Duration of surgery  23 filed at 05/05/2025 1057   Emergency Procedure  0 filed at 05/05/2025 1057   ARISCAT Total Score  26 filed at 05/05/2025 1057          Annamarie Perioperative Risk for Myocardial Infarction or Cardiac Arrest (BRODIE)      Flowsheet Row Pre-Admission Testing from 5/5/2025 in [facility]   Calculated Age Score 1.18 filed at 05/05/2025 1058   Functional Status  0 filed at 05/05/2025 1058   ASA Class  -3.29 filed at 05/05/2025 1058   Creatinine 0 filed at 05/05/2025 1058   Type of Procedure  0.18 filed at 05/05/2025 1058   BRODIE Total Score  -7.18 filed at 05/05/2025 1058   BRODIE % 0.08 filed at 05/05/2025 1058            Assessment and Plan:     Pre-Op  61 yo female scheduled for bilateral neck dissection for 5/16/2025 with Dr. Faustin. Blood work ordered. Otherwise no further orders indicated.     Cardiac  Hyperlipidemia, taking statin  DASI Score: 58.2   MET Score: 9.9  RCRI  0 which is 3.9% 30 day risk of MACE (risk for cardiac death, nonfatal myocardial infarction, and nonfactal cardiac arrest)  BRODIE score which indicates a  0.08 % risk of intraoperative or 30-day postoperative MACE    Pulmonary  No diagnosis or significant findings on chart review or clinical presentation and evaluation.    Preoperative deep breathing educational handout  provided to patient.  STOP BAN  points which is a low risk for moderate to severe JACINTO  ARISCAT:   26   points which is a low (1.6%) risk of in-hospital post-op pulmonary complications     Endocrine  Papillary carcinoma of thyroid, reason for surgery  Metastasis from thyroid cancer, s/p total thyroidectomy in     GI  Apfel: 3 points 61% risk for post operative N/V    /Renal  Counseled on avoiding NSAIDs, adequate hydration    Hematologic  No hematological medical history, however due to high Caprini score of  9 patient is at HIGH risk for perioperative DVT, informative education handout provided    Skin check: Patient was instructed to make surgeon aware of any skin changes/concerns prior to surgery.     Anesthesia:  Patient denies any anesthesia complications.     See risk scores as previously documented.            [1]   Past Medical History:  Diagnosis Date    Cancer (Multi) 2021    Disease of thyroid gland May 2022    Personal history of other diseases of the respiratory system 2013    History of bronchitis    Personal history of other diseases of the respiratory system 2016    History of acute pharyngitis    Personal history of other medical treatment     History of screening mammography   [2]   Past Surgical History:  Procedure Laterality Date    MYOMECTOMY      THYROID SURGERY  May 2022   [3]   Family History  Problem Relation Name Age of Onset    Hypertension Mother      Cancer Father [facility]     Cancer Maternal Grandmother Unique Quarles    [4] No Known Allergies

## 2025-05-05 NOTE — PREPROCEDURE INSTRUCTIONS
Medication List            Accurate as of May 5, 2025 11:24 AM. Always use your most recent med list.                atorvastatin 20 mg tablet  Commonly known as: Lipitor  Take 1 tablet (20 mg) by mouth once daily.  Medication Adjustments for Surgery: Take/Use as prescribed     ibuprofen 800 mg tablet  Commonly known as: IBU  Take 1 tablet (800 mg) by mouth 3 times a day as needed for mild pain (1 - 3) or moderate pain (4 - 6). take wtih food  Additional Medication Adjustments for Surgery: Take last dose 7 days before surgery  Notes to patient: STOP all NSAIDS (Ibuprofen, Motrin, Aleve), vitamins, herbals, supplements, and all over the counter medications for 7 days prior to surgery    Tylenol (Acetaminophen) is okay to take up until the morning of surgery for pain or headache as needed      levothyroxine 137 mcg tablet  Commonly known as: Synthroid, Levoxyl  1 pill daily for 6 days then 1.5 on sunday  Medication Adjustments for Surgery: Take/Use as prescribed     tirzepatide (weight loss) 2.5 mg/0.5 mL solution  Medication Adjustments for Surgery: Do Not take on the morning of surgery  Notes to patient: START a clear liquid diet 24 hours before your surgery- see instructions provided     venlafaxine XR 37.5 mg 24 hr capsule  Commonly known as: Effexor-XR  Take 1 capsule (37.5 mg) by mouth once daily. Do not crush or chew.  Medication Adjustments for Surgery: Take/Use as prescribed                                  PRE-OPERATIVE INSTRUCTIONS    You will receive notification one business day prior to your procedure to confirm your arrival time. It is important that you answer your phone and/or check your messages during this time. If you do not hear from the surgery center by 5 pm. the day before your procedure, please call [phone].     Please enter the building through the Outpatient entrance and take the elevator off the lobby to the 2nd floor then check in at the Outpatient Surgery desk on the 2nd  floor.    INSTRUCTIONS:  Talk to your surgeon for instructions if you should stop your aspirin, blood thinner, or diabetes medicines.  DO NOT take any multivitamins or over the counter supplements for 7-10 days before surgery.  If not being admitted, you must have an adult immediately available to drive you home after surgery. We also highly recommend you have someone stay with you for the entire day and night of your surgery.  For children having surgery, a parent or legal guardian must accompany them to the surgery center. If this is not possible, please call [phone] to make additional arrangements.  For adults who are unable to consent or make medical decisions for themselves, a legal guardian or Power of  must accompany them to the surgery center. If this is not possible, please call [phone] to make additional arrangements.  Wear comfortable, loose fitting clothing.  All jewelry and piercings must be removed. If you are unable to remove an item or have a dermal piercing, please be sure to tell the nurse when you arrive for surgery.  Nail polish and make-up must be removed.  Avoid smoking or consuming alcohol for 24 hours before surgery.  To help prevent infection, please take a shower/bath and wash your hair the night before and/or morning of surgery (or follow other specific bathing instructions provided).    Preoperative Fasting Guidelines    Why must I stop eating and drinking near surgery time?  With sedation, food or liquid in your stomach can enter your lungs causing serious complications  Increases nausea and vomiting    When do I need to stop eating and drinking before my surgery?  Do not eat any solid food after midnight the night before your surgery/procedure unless otherwise instructed by your surgeon.   If you take a GLP-1 medication (ex: Trulicity, Ozempic, Mounjaro, Zepbound, Bydyreon, Tanzeun, Saxenda, Victoza, Adlyxin, Rybelus) please follow other specific instructions provided  regarding preoperative          fasting.  You may have up to 13.5 ounces of clear liquid until TWO hours before your instructed arrival time to the hospital.  This includes water, black tea/coffee, (no milk or cream) apple juice, and electrolyte drinks (Gatorade).   You may chew gum until TWO hours before your surgery/procedure         If applicable, notify your surgeons office immediately of any new skin changes that occur to the surgical limb.      If you have any questions or concerns, please call Pre-Admission Testing at [phone].         Preoperative Fasting Guidelines for patients taking GLP-1 medications:      Why must I stop eating and drinking before surgery?    · With anesthesia, food or liquid in your stomach can enter your lungs causing serious complications    · GLP-1 medications can slow the movement of food through your stomach and intestines. This further increases the risk of food entering your lungs with anesthesia    When do I need to stop eating and drinking before my surgery?    · To help ensure food has passed out of your stomach, START a clear liquid diet 24 hours before your surgery    · On the day of your surgery/procedure, STOP all clear liquids 2 hours before your arrival time to the hospital/facility    A clear liquid diet consists of clear liquids and foods that melt into a clear liquid (i.e. gelatin) and excludes solid foods and liquids you cannot see through (i.e. milk). Clears can and should contain sugar to obtain a sufficient number of calories. A clear liquid diet includes    · Clear, fat-free broth    · Clear nutritional drinks    · Pulp-free popsicles, vegetable and fruit juice    · Gelatin    · Coffee and tea without creamer or milk    · Clear soda and sports drinks      Diabetic Patients    · Clear liquids should not be sugar-free    · Check your blood glucose levels as you normally do    · If you have symptoms of low blood glucose (shakiness, sweating, dizziness,  confusion) or high blood glucose (dry mouth, excessive thirst, frequent urination, blurry vision), check your blood glucose level    · For low blood glucose increase your consumption of sugar-containing clear liquid    · For high blood glucose, decrease your consumption of sugar-containing clears and treat as you normally would    · If symptoms persist seek medical attention      Examples of GLP-1 Medications:  · Trulicity   · Ozempic    · Mounjaro   · Zepbound  · Bydyreon   · Tanzeun · Saxenda · Victoza · Adlyxin · Rybelus         Home Preoperative Antibacterial Shower with Chlorhexidine gluconate (CHG)     What is a home preoperative antibacterial shower?  This shower is a way of cleaning the skin with a germ killing solution before surgery. The solution contains chlorhexidine gluconate, commonly known as CHG. CHG is a skin cleanser with germ killing ability. Let your doctor know if you are allergic to chlorhexidine.    Why do I need to take a preoperative antibacterial shower?  Skin is not sterile. It is best to try to make your skin as free of germs as possible before surgery. Proper cleansing with a germ killing soap before surgery can lower the number of germs on your skin. This helps to reduce the risk of infection at the surgical site. Following the instructions listed below will help you prepare your skin for surgery.    How do I use the solution?  Begin using your CHG soap the night before and again the morning of your procedure.   Do not shave the day before or day of surgery.  Remove all jewelry until after surgery. Take off rings and take out all body-piercing jewelry.  Wash your face and hair with normal soap and shampoo before you use the CHG soap.  Apply the CHG solution to a clean wet washcloth. Move away from the water to avoid premature rinsing of the CHG soap as you are applying. Firmly lather your entire body from the neck down. Do not use CHG on your face, eyes, ears, or genitals.   Pay special  attention to the area where your incisions will be located.  Do not scrub your skin too hard.  It is important to allow the CHG soap to sit on your skin for 3-5 minutes.  Rinse the solution off your body completely. Do not wash with your normal soap after the CHG soap solution.  Pat yourself dry with a clean, soft towel.  Do not apply powders, lotions or deodorants as these might block how the CHG soap works.   Dress in clean clothing.  Be sure to sleep with clean, freshly laundered sheets.  Be aware that CHG can cause stains on fabric. Rinse your washcloth and other linens that have contact with CHG completely. Use only non-chlorine detergents to launder the items used.

## 2025-05-05 NOTE — PREPROCEDURE INSTRUCTIONS
Thank you for visiting [facility] at [facility]. If you have any changes to your health condition, please call the SURGEON's office to alert them and give them details of your symptoms.        Preoperative Brain Exercises    What are brain exercises?  A brain exercise is any activity that engages your thinking (cognitive) skills.    What types of activities are considered brain exercises?  Jigsaw puzzles, crossword puzzles, word jumble, memory games, word search, and many more.  Many can be found free online or on your phone via a mobile michaela.    Why should I do brain exercises before my surgery?  More recent research has shown brain exercise before surgery can lower the risk of postoperative delirium (confusion) which can be especially important for older adults.  Patients who did brain exercises for 5 to 10 hours the days before surgery, cut their risk of postoperative delirium in half up to 1 week after surgery.      Preoperative Deep Breathing Exercises    Why it is important to do deep breathing exercises before my surgery?  Deep breathing exercises strengthen your breathing muscles.  This helps you to recover after your surgery and decreases the chance of breathing complications.    How are the deep breathing exercises done?  Sit straight with your back supported.  Breathe in deeply and slowly through your nose. Your lower rib cage should expand and your abdomen may move forward.  Hold that breath for 3 to 5 seconds.  Breathe out through pursed lips, slowly and completely.  Rest and repeat 10 times every hour while awake.  Rest longer if you become dizzy or lightheaded.      Patient and Family Education   Ways You Can Help Prevent Blood Clots     This handout explains some simple things you can do to help prevent blood clots.      Blood clots are blockages that can form in the body's veins. When a blood clot forms in your deep veins, it may be called a deep vein thrombosis, or DVT for short. Blood clots can  happen in any part of the body where blood flows, but they are most common in the arms and legs. If a piece of a blood clot breaks free and travels to the lungs, it is called a pulmonary embolus (PE). A PE can be a very serious problem.      Being in the hospital or having surgery can raise your chances of getting a blood clot because you may not be well enough to move around as much as you normally do.      Ways you can help prevent blood clots in the hospital         Wearing SCDs. SCDs stands for Sequential Compression Devices.   SCDs are special sleeves that wrap around your legs  They attach to a pump that fills them with air to gently squeeze your legs every few minutes.   This helps return the blood in your legs to your heart.   SCDs should only be taken off when walking or bathing.   SCDs may not be comfortable, but they can help save your life.               Wearing compression stockings - if your doctor orders them. These special snug fitting stockings gently squeeze your legs to help blood flow.       Walking. Walking helps move the blood in your legs.   If your doctor says it is ok, try walking the halls at least   5 times a day. Ask us to help you get up, so you don't fall.      Taking any blood thinning medicines your doctor orders.          ©[facility]; 3/23        Ways you can help prevent blood clots at home       Wearing compression stockings - if your doctor orders them. ? Walking - to help move the blood in your legs.       Taking any blood thinning medicines your doctor orders.      Signs of a blood clot or PE      Tell your doctor or nurse know right away if you have of the problems listed below.    If you are at home, seek medical care right away. Call 911 for chest pain or problems breathing.          Signs of a blood clot (DVT) - such as pain,  swelling, redness or warmth in your arm or leg      Signs of a pulmonary embolism (PE) - such as chest     pain or feeling short of breath

## 2025-05-16 ENCOUNTER — HOSPITAL ENCOUNTER (OUTPATIENT)
Facility: HOSPITAL | Age: 60
End: 2025-05-16
Attending: OTOLARYNGOLOGY | Admitting: OTOLARYNGOLOGY
Payer: COMMERCIAL

## 2025-05-16 ENCOUNTER — ANESTHESIA (OUTPATIENT)
Dept: OPERATING ROOM | Facility: HOSPITAL | Age: 60
End: 2025-05-16
Payer: COMMERCIAL

## 2025-05-16 ENCOUNTER — ANESTHESIA EVENT (OUTPATIENT)
Dept: OPERATING ROOM | Facility: HOSPITAL | Age: 60
End: 2025-05-16
Payer: COMMERCIAL

## 2025-05-16 DIAGNOSIS — G89.18 ACUTE POSTOPERATIVE PAIN: Primary | ICD-10-CM

## 2025-05-16 DIAGNOSIS — K59.03 DRUG-INDUCED CONSTIPATION: ICD-10-CM

## 2025-05-16 DIAGNOSIS — C73 PAPILLARY CARCINOMA OF THYROID: ICD-10-CM

## 2025-05-16 DIAGNOSIS — C73 METASTASIS FROM THYROID CANCER (MULTI): ICD-10-CM

## 2025-05-16 DIAGNOSIS — C79.9 METASTASIS FROM THYROID CANCER (MULTI): ICD-10-CM

## 2025-05-16 LAB
ALBUMIN SERPL BCP-MCNC: 3.7 G/DL (ref 3.4–5)
ALBUMIN SERPL BCP-MCNC: 4.4 G/DL (ref 3.4–5)
ANION GAP SERPL CALC-SCNC: 15 MMOL/L (ref 10–20)
ANION GAP SERPL CALC-SCNC: 18 MMOL/L (ref 10–20)
BUN SERPL-MCNC: 12 MG/DL (ref 6–23)
BUN SERPL-MCNC: 14 MG/DL (ref 6–23)
CALCIUM SERPL-MCNC: 8.1 MG/DL (ref 8.6–10.3)
CALCIUM SERPL-MCNC: 9 MG/DL (ref 8.6–10.3)
CHLORIDE SERPL-SCNC: 101 MMOL/L (ref 98–107)
CHLORIDE SERPL-SCNC: 104 MMOL/L (ref 98–107)
CO2 SERPL-SCNC: 24 MMOL/L (ref 21–32)
CO2 SERPL-SCNC: 26 MMOL/L (ref 21–32)
CREAT SERPL-MCNC: 0.7 MG/DL (ref 0.5–1.05)
CREAT SERPL-MCNC: 0.76 MG/DL (ref 0.5–1.05)
EGFRCR SERPLBLD CKD-EPI 2021: 90 ML/MIN/1.73M*2
EGFRCR SERPLBLD CKD-EPI 2021: >90 ML/MIN/1.73M*2
GLUCOSE SERPL-MCNC: 107 MG/DL (ref 74–99)
GLUCOSE SERPL-MCNC: 179 MG/DL (ref 74–99)
MAGNESIUM SERPL-MCNC: 1.97 MG/DL (ref 1.6–2.4)
PHOSPHATE SERPL-MCNC: 4.8 MG/DL (ref 2.5–4.9)
PHOSPHATE SERPL-MCNC: 7.1 MG/DL (ref 2.5–4.9)
POTASSIUM SERPL-SCNC: 3.9 MMOL/L (ref 3.5–5.3)
POTASSIUM SERPL-SCNC: 5.2 MMOL/L (ref 3.5–5.3)
PTH-INTACT IO % DIF SERPL: 17 PG/ML (ref 12–88)
SODIUM SERPL-SCNC: 139 MMOL/L (ref 136–145)
SODIUM SERPL-SCNC: 140 MMOL/L (ref 136–145)

## 2025-05-16 PROCEDURE — 7100000002 HC RECOVERY ROOM TIME - EACH INCREMENTAL 1 MINUTE: Performed by: OTOLARYNGOLOGY

## 2025-05-16 PROCEDURE — 2500000005 HC RX 250 GENERAL PHARMACY W/O HCPCS: Performed by: ANESTHESIOLOGY

## 2025-05-16 PROCEDURE — 2500000004 HC RX 250 GENERAL PHARMACY W/ HCPCS (ALT 636 FOR OP/ED): Performed by: OTOLARYNGOLOGY

## 2025-05-16 PROCEDURE — 2500000002 HC RX 250 W HCPCS SELF ADMINISTERED DRUGS (ALT 637 FOR MEDICARE OP, ALT 636 FOR OP/ED): Performed by: ANESTHESIOLOGIST ASSISTANT

## 2025-05-16 PROCEDURE — 83970 ASSAY OF PARATHORMONE: CPT | Performed by: OTOLARYNGOLOGY

## 2025-05-16 PROCEDURE — 88307 TISSUE EXAM BY PATHOLOGIST: CPT | Mod: TC,STJLAB | Performed by: OTOLARYNGOLOGY

## 2025-05-16 PROCEDURE — 2500000005 HC RX 250 GENERAL PHARMACY W/O HCPCS: Performed by: ANESTHESIOLOGIST ASSISTANT

## 2025-05-16 PROCEDURE — 36415 COLL VENOUS BLD VENIPUNCTURE: CPT | Performed by: OTOLARYNGOLOGY

## 2025-05-16 PROCEDURE — 88307 TISSUE EXAM BY PATHOLOGIST: CPT | Performed by: PATHOLOGY

## 2025-05-16 PROCEDURE — 7100000011 HC EXTENDED STAY RECOVERY HOURLY - NURSING UNIT

## 2025-05-16 PROCEDURE — 2500000004 HC RX 250 GENERAL PHARMACY W/ HCPCS (ALT 636 FOR OP/ED): Mod: JZ | Performed by: ANESTHESIOLOGY

## 2025-05-16 PROCEDURE — 38724 REMOVAL OF LYMPH NODES NECK: CPT | Performed by: OTOLARYNGOLOGY

## 2025-05-16 PROCEDURE — 37799 UNLISTED PX VASCULAR SURGERY: CPT | Performed by: OTOLARYNGOLOGY

## 2025-05-16 PROCEDURE — 88305 TISSUE EXAM BY PATHOLOGIST: CPT | Performed by: PATHOLOGY

## 2025-05-16 PROCEDURE — 3700000001 HC GENERAL ANESTHESIA TIME - INITIAL BASE CHARGE: Performed by: OTOLARYNGOLOGY

## 2025-05-16 PROCEDURE — 83735 ASSAY OF MAGNESIUM: CPT | Performed by: OTOLARYNGOLOGY

## 2025-05-16 PROCEDURE — 2500000004 HC RX 250 GENERAL PHARMACY W/ HCPCS (ALT 636 FOR OP/ED): Mod: JZ | Performed by: ANESTHESIOLOGIST ASSISTANT

## 2025-05-16 PROCEDURE — 80069 RENAL FUNCTION PANEL: CPT | Performed by: OTOLARYNGOLOGY

## 2025-05-16 PROCEDURE — 2500000001 HC RX 250 WO HCPCS SELF ADMINISTERED DRUGS (ALT 637 FOR MEDICARE OP): Performed by: OTOLARYNGOLOGY

## 2025-05-16 PROCEDURE — 88331 PATH CONSLTJ SURG 1 BLK 1SPC: CPT | Performed by: PATHOLOGY

## 2025-05-16 PROCEDURE — 88331 PATH CONSLTJ SURG 1 BLK 1SPC: CPT | Mod: TC,SUR,STJLAB | Performed by: PATHOLOGY

## 2025-05-16 PROCEDURE — 2720000007 HC OR 272 NO HCPCS: Performed by: OTOLARYNGOLOGY

## 2025-05-16 PROCEDURE — S0109 METHADONE ORAL 5MG: HCPCS | Performed by: ANESTHESIOLOGIST ASSISTANT

## 2025-05-16 PROCEDURE — 3700000002 HC GENERAL ANESTHESIA TIME - EACH INCREMENTAL 1 MINUTE: Performed by: OTOLARYNGOLOGY

## 2025-05-16 PROCEDURE — 3600000009 HC OR TIME - EACH INCREMENTAL 1 MINUTE - PROCEDURE LEVEL FOUR: Performed by: OTOLARYNGOLOGY

## 2025-05-16 PROCEDURE — 3600000004 HC OR TIME - INITIAL BASE CHARGE - PROCEDURE LEVEL FOUR: Performed by: OTOLARYNGOLOGY

## 2025-05-16 PROCEDURE — 7100000001 HC RECOVERY ROOM TIME - INITIAL BASE CHARGE: Performed by: OTOLARYNGOLOGY

## 2025-05-16 RX ORDER — PROCHLORPERAZINE EDISYLATE 5 MG/ML
5 INJECTION INTRAMUSCULAR; INTRAVENOUS ONCE AS NEEDED
Status: DISCONTINUED | OUTPATIENT
Start: 2025-05-16 | End: 2025-05-16 | Stop reason: HOSPADM

## 2025-05-16 RX ORDER — OXYCODONE HYDROCHLORIDE 5 MG/1
5 TABLET ORAL EVERY 4 HOURS PRN
Status: DISPENSED | OUTPATIENT
Start: 2025-05-16

## 2025-05-16 RX ORDER — HYDROMORPHONE HYDROCHLORIDE 0.2 MG/ML
0.1 INJECTION INTRAMUSCULAR; INTRAVENOUS; SUBCUTANEOUS EVERY 5 MIN PRN
Status: DISCONTINUED | OUTPATIENT
Start: 2025-05-16 | End: 2025-05-16 | Stop reason: HOSPADM

## 2025-05-16 RX ORDER — FENTANYL CITRATE 50 UG/ML
INJECTION, SOLUTION INTRAMUSCULAR; INTRAVENOUS AS NEEDED
Status: DISCONTINUED | OUTPATIENT
Start: 2025-05-16 | End: 2025-05-16

## 2025-05-16 RX ORDER — DEXAMETHASONE SODIUM PHOSPHATE 10 MG/ML
INJECTION INTRAMUSCULAR; INTRAVENOUS AS NEEDED
Status: DISCONTINUED | OUTPATIENT
Start: 2025-05-16 | End: 2025-05-16

## 2025-05-16 RX ORDER — TRAMADOL HYDROCHLORIDE 50 MG/1
50 TABLET ORAL EVERY 6 HOURS PRN
Status: DISPENSED | OUTPATIENT
Start: 2025-05-16

## 2025-05-16 RX ORDER — PROPOFOL 10 MG/ML
INJECTION, EMULSION INTRAVENOUS AS NEEDED
Status: DISCONTINUED | OUTPATIENT
Start: 2025-05-16 | End: 2025-05-16

## 2025-05-16 RX ORDER — SUCCINYLCHOLINE CHLORIDE 100 MG/5ML
SYRINGE (ML) INTRAVENOUS AS NEEDED
Status: DISCONTINUED | OUTPATIENT
Start: 2025-05-16 | End: 2025-05-16

## 2025-05-16 RX ORDER — PHENYLEPHRINE HCL IN 0.9% NACL 1 MG/10 ML
SYRINGE (ML) INTRAVENOUS AS NEEDED
Status: DISCONTINUED | OUTPATIENT
Start: 2025-05-16 | End: 2025-05-16

## 2025-05-16 RX ORDER — HYDROMORPHONE HYDROCHLORIDE 1 MG/ML
1 INJECTION, SOLUTION INTRAMUSCULAR; INTRAVENOUS; SUBCUTANEOUS EVERY 5 MIN PRN
Status: DISCONTINUED | OUTPATIENT
Start: 2025-05-16 | End: 2025-05-16 | Stop reason: HOSPADM

## 2025-05-16 RX ORDER — SODIUM CHLORIDE, SODIUM LACTATE, POTASSIUM CHLORIDE, CALCIUM CHLORIDE 600; 310; 30; 20 MG/100ML; MG/100ML; MG/100ML; MG/100ML
125 INJECTION, SOLUTION INTRAVENOUS CONTINUOUS
Status: ACTIVE | OUTPATIENT
Start: 2025-05-16 | End: 2025-05-16

## 2025-05-16 RX ORDER — DOCUSATE SODIUM 100 MG/1
100 CAPSULE, LIQUID FILLED ORAL 2 TIMES DAILY
Status: DISPENSED | OUTPATIENT
Start: 2025-05-16

## 2025-05-16 RX ORDER — METOPROLOL TARTRATE 1 MG/ML
2.5 INJECTION, SOLUTION INTRAVENOUS EVERY 30 MIN PRN
Status: DISCONTINUED | OUTPATIENT
Start: 2025-05-16 | End: 2025-05-16 | Stop reason: HOSPADM

## 2025-05-16 RX ORDER — OXYCODONE AND ACETAMINOPHEN 5; 325 MG/1; MG/1
1 TABLET ORAL EVERY 4 HOURS PRN
Status: DISCONTINUED | OUTPATIENT
Start: 2025-05-16 | End: 2025-05-16 | Stop reason: HOSPADM

## 2025-05-16 RX ORDER — PHENYLEPHRINE 10 MG/250 ML(40 MCG/ML)IN 0.9 % SOD.CHLORIDE INTRAVENOUS
CONTINUOUS PRN
Status: DISCONTINUED | OUTPATIENT
Start: 2025-05-16 | End: 2025-05-16

## 2025-05-16 RX ORDER — LIDOCAINE HYDROCHLORIDE AND EPINEPHRINE 10; 10 UG/ML; MG/ML
INJECTION, SOLUTION INFILTRATION; PERINEURAL AS NEEDED
Status: DISCONTINUED | OUTPATIENT
Start: 2025-05-16 | End: 2025-05-16 | Stop reason: HOSPADM

## 2025-05-16 RX ORDER — MIDAZOLAM HYDROCHLORIDE 1 MG/ML
INJECTION, SOLUTION INTRAMUSCULAR; INTRAVENOUS AS NEEDED
Status: DISCONTINUED | OUTPATIENT
Start: 2025-05-16 | End: 2025-05-16

## 2025-05-16 RX ORDER — CEFAZOLIN SODIUM 2 G/100ML
INJECTION, SOLUTION INTRAVENOUS AS NEEDED
Status: DISCONTINUED | OUTPATIENT
Start: 2025-05-16 | End: 2025-05-16

## 2025-05-16 RX ORDER — LEVOTHYROXINE SODIUM 137 UG/1
137 TABLET ORAL DAILY
Status: DISPENSED | OUTPATIENT
Start: 2025-05-16

## 2025-05-16 RX ORDER — NALOXONE HYDROCHLORIDE 0.4 MG/ML
0.2 INJECTION, SOLUTION INTRAMUSCULAR; INTRAVENOUS; SUBCUTANEOUS EVERY 5 MIN PRN
Status: ACTIVE | OUTPATIENT
Start: 2025-05-16

## 2025-05-16 RX ORDER — ATORVASTATIN CALCIUM 20 MG/1
20 TABLET, FILM COATED ORAL NIGHTLY
Status: DISPENSED | OUTPATIENT
Start: 2025-05-16

## 2025-05-16 RX ORDER — HYDRALAZINE HYDROCHLORIDE 20 MG/ML
10 INJECTION INTRAMUSCULAR; INTRAVENOUS EVERY 30 MIN PRN
Status: DISCONTINUED | OUTPATIENT
Start: 2025-05-16 | End: 2025-05-16 | Stop reason: HOSPADM

## 2025-05-16 RX ORDER — OXYCODONE HCL 5 MG/5 ML
5 SOLUTION, ORAL ORAL EVERY 4 HOURS PRN
Status: ACTIVE | OUTPATIENT
Start: 2025-05-16

## 2025-05-16 RX ORDER — ACETAMINOPHEN 325 MG/1
650 TABLET ORAL EVERY 4 HOURS PRN
Status: DISCONTINUED | OUTPATIENT
Start: 2025-05-16 | End: 2025-05-16 | Stop reason: HOSPADM

## 2025-05-16 RX ORDER — ACETAMINOPHEN 325 MG/1
650 TABLET ORAL EVERY 6 HOURS PRN
Status: DISPENSED | OUTPATIENT
Start: 2025-05-16

## 2025-05-16 RX ORDER — VENLAFAXINE HYDROCHLORIDE 37.5 MG/1
37.5 CAPSULE, EXTENDED RELEASE ORAL NIGHTLY
Status: DISPENSED | OUTPATIENT
Start: 2025-05-16

## 2025-05-16 RX ORDER — OXYCODONE HYDROCHLORIDE 10 MG/1
10 TABLET ORAL EVERY 4 HOURS PRN
Status: DISCONTINUED | OUTPATIENT
Start: 2025-05-16 | End: 2025-05-16 | Stop reason: HOSPADM

## 2025-05-16 RX ORDER — ONDANSETRON HYDROCHLORIDE 2 MG/ML
4 INJECTION, SOLUTION INTRAVENOUS ONCE AS NEEDED
Status: DISCONTINUED | OUTPATIENT
Start: 2025-05-16 | End: 2025-05-16 | Stop reason: HOSPADM

## 2025-05-16 RX ORDER — MIDAZOLAM HYDROCHLORIDE 1 MG/ML
1 INJECTION, SOLUTION INTRAMUSCULAR; INTRAVENOUS EVERY 30 MIN PRN
Status: DISCONTINUED | OUTPATIENT
Start: 2025-05-16 | End: 2025-05-16 | Stop reason: HOSPADM

## 2025-05-16 RX ORDER — ONDANSETRON HYDROCHLORIDE 2 MG/ML
INJECTION, SOLUTION INTRAVENOUS AS NEEDED
Status: DISCONTINUED | OUTPATIENT
Start: 2025-05-16 | End: 2025-05-16

## 2025-05-16 RX ORDER — ALBUTEROL SULFATE 0.83 MG/ML
2.5 SOLUTION RESPIRATORY (INHALATION) ONCE AS NEEDED
Status: DISCONTINUED | OUTPATIENT
Start: 2025-05-16 | End: 2025-05-16 | Stop reason: HOSPADM

## 2025-05-16 RX ORDER — LIDOCAINE HYDROCHLORIDE 20 MG/ML
INJECTION, SOLUTION EPIDURAL; INFILTRATION; INTRACAUDAL; PERINEURAL AS NEEDED
Status: DISCONTINUED | OUTPATIENT
Start: 2025-05-16 | End: 2025-05-16

## 2025-05-16 RX ORDER — CEFAZOLIN SODIUM 2 G/100ML
2 INJECTION, SOLUTION INTRAVENOUS EVERY 8 HOURS
Status: COMPLETED | OUTPATIENT
Start: 2025-05-16 | End: 2025-05-17

## 2025-05-16 RX ORDER — METHADONE HYDROCHLORIDE 10 MG/1
TABLET ORAL AS NEEDED
Status: DISCONTINUED | OUTPATIENT
Start: 2025-05-16 | End: 2025-05-16

## 2025-05-16 RX ORDER — ONDANSETRON 4 MG/1
4 TABLET, FILM COATED ORAL EVERY 8 HOURS PRN
Status: ACTIVE | OUTPATIENT
Start: 2025-05-16

## 2025-05-16 RX ORDER — ACETAMINOPHEN 325 MG/1
975 TABLET ORAL ONCE
Status: DISCONTINUED | OUTPATIENT
Start: 2025-05-16 | End: 2025-05-16 | Stop reason: HOSPADM

## 2025-05-16 RX ORDER — PHENAZOPYRIDINE HYDROCHLORIDE 100 MG/1
200 TABLET, FILM COATED ORAL ONCE AS NEEDED
Status: DISCONTINUED | OUTPATIENT
Start: 2025-05-16 | End: 2025-05-16 | Stop reason: HOSPADM

## 2025-05-16 RX ORDER — ONDANSETRON HYDROCHLORIDE 2 MG/ML
4 INJECTION, SOLUTION INTRAVENOUS EVERY 8 HOURS PRN
Status: ACTIVE | OUTPATIENT
Start: 2025-05-16

## 2025-05-16 RX ADMIN — SODIUM CHLORIDE, POTASSIUM CHLORIDE, SODIUM LACTATE AND CALCIUM CHLORIDE: 600; 310; 30; 20 INJECTION, SOLUTION INTRAVENOUS at 07:49

## 2025-05-16 RX ADMIN — FENTANYL CITRATE 50 MCG: 50 INJECTION, SOLUTION INTRAMUSCULAR; INTRAVENOUS at 08:00

## 2025-05-16 RX ADMIN — DOCUSATE SODIUM 100 MG: 100 CAPSULE, LIQUID FILLED ORAL at 20:14

## 2025-05-16 RX ADMIN — FENTANYL CITRATE 50 MCG: 50 INJECTION, SOLUTION INTRAMUSCULAR; INTRAVENOUS at 07:58

## 2025-05-16 RX ADMIN — Medication 150 MCG: at 11:57

## 2025-05-16 RX ADMIN — REMIFENTANIL HYDROCHLORIDE 0.05 MCG/KG/MIN: 1 INJECTION, POWDER, LYOPHILIZED, FOR SOLUTION INTRAVENOUS at 08:01

## 2025-05-16 RX ADMIN — MIDAZOLAM 2 MG: 1 INJECTION INTRAMUSCULAR; INTRAVENOUS at 07:30

## 2025-05-16 RX ADMIN — Medication 8 L/MIN: at 13:05

## 2025-05-16 RX ADMIN — CEFAZOLIN SODIUM 2 G: 2 INJECTION, SOLUTION INTRAVENOUS at 12:07

## 2025-05-16 RX ADMIN — CEFAZOLIN SODIUM 2 G: 2 INJECTION, SOLUTION INTRAVENOUS at 16:50

## 2025-05-16 RX ADMIN — HYDROMORPHONE HYDROCHLORIDE 1 MG: 1 INJECTION, SOLUTION INTRAMUSCULAR; INTRAVENOUS; SUBCUTANEOUS at 13:41

## 2025-05-16 RX ADMIN — HYDROMORPHONE HYDROCHLORIDE 0.5 MG: 1 INJECTION, SOLUTION INTRAMUSCULAR; INTRAVENOUS; SUBCUTANEOUS at 13:30

## 2025-05-16 RX ADMIN — DEXAMETHASONE SODIUM PHOSPHATE 10 MG: 10 INJECTION, SOLUTION INTRAMUSCULAR; INTRAVENOUS at 08:09

## 2025-05-16 RX ADMIN — Medication 150 MCG: at 08:37

## 2025-05-16 RX ADMIN — LIDOCAINE HYDROCHLORIDE 100 MG: 20 INJECTION, SOLUTION EPIDURAL; INFILTRATION; INTRACAUDAL; PERINEURAL at 07:49

## 2025-05-16 RX ADMIN — Medication 150 MCG: at 08:10

## 2025-05-16 RX ADMIN — Medication 200 MCG: at 08:47

## 2025-05-16 RX ADMIN — Medication 100 MG: at 07:49

## 2025-05-16 RX ADMIN — OXYCODONE HYDROCHLORIDE 5 MG: 5 TABLET ORAL at 18:50

## 2025-05-16 RX ADMIN — HYDROMORPHONE HYDROCHLORIDE 1 MG: 1 INJECTION, SOLUTION INTRAMUSCULAR; INTRAVENOUS; SUBCUTANEOUS at 14:30

## 2025-05-16 RX ADMIN — ATORVASTATIN CALCIUM 20 MG: 20 TABLET, FILM COATED ORAL at 20:14

## 2025-05-16 RX ADMIN — OXYCODONE HYDROCHLORIDE 5 MG: 5 TABLET ORAL at 23:16

## 2025-05-16 RX ADMIN — METHADONE HYDROCHLORIDE 20 MG: 10 TABLET ORAL at 07:34

## 2025-05-16 RX ADMIN — VENLAFAXINE HYDROCHLORIDE 37.5 MG: 37.5 CAPSULE, EXTENDED RELEASE ORAL at 20:14

## 2025-05-16 RX ADMIN — Medication 0.5 MCG/KG/MIN: at 08:11

## 2025-05-16 RX ADMIN — PROPOFOL 200 MG: 10 INJECTION, EMULSION INTRAVENOUS at 07:49

## 2025-05-16 RX ADMIN — PROPOFOL 100 MG: 10 INJECTION, EMULSION INTRAVENOUS at 08:18

## 2025-05-16 RX ADMIN — CEFAZOLIN SODIUM 2 G: 2 INJECTION, SOLUTION INTRAVENOUS at 08:07

## 2025-05-16 RX ADMIN — HYDROMORPHONE HYDROCHLORIDE 0.5 MG: 1 INJECTION, SOLUTION INTRAMUSCULAR; INTRAVENOUS; SUBCUTANEOUS at 13:13

## 2025-05-16 RX ADMIN — ONDANSETRON 4 MG: 2 INJECTION, SOLUTION INTRAMUSCULAR; INTRAVENOUS at 12:22

## 2025-05-16 RX ADMIN — HYDROMORPHONE HYDROCHLORIDE 1 MG: 1 INJECTION, SOLUTION INTRAMUSCULAR; INTRAVENOUS; SUBCUTANEOUS at 14:14

## 2025-05-16 SDOH — SOCIAL STABILITY: SOCIAL INSECURITY
WITHIN THE LAST YEAR, HAVE YOU BEEN HUMILIATED OR EMOTIONALLY ABUSED IN OTHER WAYS BY YOUR PARTNER OR EX-PARTNER?: PATIENT DECLINED

## 2025-05-16 SDOH — SOCIAL STABILITY: SOCIAL INSECURITY
WITHIN THE LAST YEAR, HAVE YOU BEEN KICKED, HIT, SLAPPED, OR OTHERWISE PHYSICALLY HURT BY YOUR PARTNER OR EX-PARTNER?: PATIENT DECLINED

## 2025-05-16 SDOH — SOCIAL STABILITY: SOCIAL INSECURITY
WITHIN THE LAST YEAR, HAVE YOU BEEN RAPED OR FORCED TO HAVE ANY KIND OF SEXUAL ACTIVITY BY YOUR PARTNER OR EX-PARTNER?: PATIENT DECLINED

## 2025-05-16 SDOH — ECONOMIC STABILITY: TRANSPORTATION INSECURITY
IN THE PAST 12 MONTHS, HAS LACK OF TRANSPORTATION KEPT YOU FROM MEDICAL APPOINTMENTS OR FROM GETTING MEDICATIONS?: PATIENT DECLINED

## 2025-05-16 SDOH — ECONOMIC STABILITY: FOOD INSECURITY
WITHIN THE PAST 12 MONTHS, YOU WORRIED THAT YOUR FOOD WOULD RUN OUT BEFORE YOU GOT THE MONEY TO BUY MORE.: PATIENT DECLINED

## 2025-05-16 SDOH — HEALTH STABILITY: MENTAL HEALTH: CURRENT SMOKER: 0

## 2025-05-16 SDOH — ECONOMIC STABILITY: HOUSING INSECURITY: IN THE LAST 12 MONTHS, WAS THERE A TIME WHEN YOU WERE NOT ABLE TO PAY THE MORTGAGE OR RENT ON TIME?: PATIENT DECLINED

## 2025-05-16 SDOH — SOCIAL STABILITY: SOCIAL INSECURITY: ARE THERE ANY APPARENT SIGNS OF INJURIES/BEHAVIORS THAT COULD BE RELATED TO ABUSE/NEGLECT?: NO

## 2025-05-16 SDOH — SOCIAL STABILITY: SOCIAL INSECURITY: ABUSE: ADULT

## 2025-05-16 SDOH — SOCIAL STABILITY: SOCIAL INSECURITY: WITHIN THE LAST YEAR, HAVE YOU BEEN AFRAID OF YOUR PARTNER OR EX-PARTNER?: PATIENT DECLINED

## 2025-05-16 SDOH — ECONOMIC STABILITY: HOUSING INSECURITY: AT ANY TIME IN THE PAST 12 MONTHS, WERE YOU HOMELESS OR LIVING IN A SHELTER (INCLUDING NOW)?: PATIENT DECLINED

## 2025-05-16 SDOH — ECONOMIC STABILITY: INCOME INSECURITY
IN THE PAST 12 MONTHS HAS THE ELECTRIC, GAS, OIL, OR WATER COMPANY THREATENED TO SHUT OFF SERVICES IN YOUR HOME?: PATIENT DECLINED

## 2025-05-16 SDOH — ECONOMIC STABILITY: HOUSING INSECURITY: IN THE PAST 12 MONTHS, HOW MANY TIMES HAVE YOU MOVED WHERE YOU WERE LIVING?: 0

## 2025-05-16 SDOH — SOCIAL STABILITY: SOCIAL INSECURITY: HAVE YOU HAD ANY THOUGHTS OF HARMING ANYONE ELSE?: NO

## 2025-05-16 SDOH — ECONOMIC STABILITY: FOOD INSECURITY: HOW HARD IS IT FOR YOU TO PAY FOR THE VERY BASICS LIKE FOOD, HOUSING, MEDICAL CARE, AND HEATING?: PATIENT DECLINED

## 2025-05-16 SDOH — ECONOMIC STABILITY: HOUSING INSECURITY: DO YOU FEEL UNSAFE GOING BACK TO THE PLACE WHERE YOU LIVE?: NO

## 2025-05-16 SDOH — SOCIAL STABILITY: SOCIAL INSECURITY: WERE YOU ABLE TO COMPLETE ALL THE BEHAVIORAL HEALTH SCREENINGS?: YES

## 2025-05-16 SDOH — SOCIAL STABILITY: SOCIAL INSECURITY: HAS ANYONE EVER THREATENED TO HURT YOUR FAMILY OR YOUR PETS?: NO

## 2025-05-16 SDOH — SOCIAL STABILITY: SOCIAL INSECURITY: ARE YOU OR HAVE YOU BEEN THREATENED OR ABUSED PHYSICALLY, EMOTIONALLY, OR SEXUALLY BY ANYONE?: NO

## 2025-05-16 SDOH — ECONOMIC STABILITY: FOOD INSECURITY: WITHIN THE PAST 12 MONTHS, THE FOOD YOU BOUGHT JUST DIDN'T LAST AND YOU DIDN'T HAVE MONEY TO GET MORE.: PATIENT DECLINED

## 2025-05-16 SDOH — SOCIAL STABILITY: SOCIAL INSECURITY: DO YOU FEEL ANYONE HAS EXPLOITED OR TAKEN ADVANTAGE OF YOU FINANCIALLY OR OF YOUR PERSONAL PROPERTY?: NO

## 2025-05-16 SDOH — SOCIAL STABILITY: SOCIAL INSECURITY
ASK PARENT OR GUARDIAN: ARE THERE TIMES WHEN YOU, YOUR CHILD(REN), OR ANY MEMBER OF YOUR HOUSEHOLD FEEL UNSAFE, HARMED, OR THREATENED AROUND PERSONS WITH WHOM YOU KNOW OR LIVE?: NO

## 2025-05-16 SDOH — SOCIAL STABILITY: SOCIAL INSECURITY: DOES ANYONE TRY TO KEEP YOU FROM HAVING/CONTACTING OTHER FRIENDS OR DOING THINGS OUTSIDE YOUR HOME?: NO

## 2025-05-16 SDOH — SOCIAL STABILITY: SOCIAL INSECURITY: HAVE YOU HAD THOUGHTS OF HARMING ANYONE ELSE?: NO

## 2025-05-16 SDOH — SOCIAL STABILITY: SOCIAL INSECURITY: DO YOU FEEL UNSAFE GOING BACK TO THE PLACE WHERE YOU ARE LIVING?: NO

## 2025-05-16 ASSESSMENT — PAIN SCALES - GENERAL
PAINLEVEL_OUTOF10: 9
PAINLEVEL_OUTOF10: 9
PAINLEVEL_OUTOF10: 8
PAINLEVEL_OUTOF10: 10 - WORST POSSIBLE PAIN
PAINLEVEL_OUTOF10: 8
PAINLEVEL_OUTOF10: 7
PAINLEVEL_OUTOF10: 5 - MODERATE PAIN
PAIN_LEVEL: 7
PAINLEVEL_OUTOF10: 10 - WORST POSSIBLE PAIN
PAINLEVEL_OUTOF10: 7
PAINLEVEL_OUTOF10: 0 - NO PAIN
PAINLEVEL_OUTOF10: 3

## 2025-05-16 ASSESSMENT — ACTIVITIES OF DAILY LIVING (ADL)
HEARING - RIGHT EAR: FUNCTIONAL
DRESSING YOURSELF: INDEPENDENT
LACK_OF_TRANSPORTATION: PATIENT DECLINED
WALKS IN HOME: INDEPENDENT
JUDGMENT_ADEQUATE_SAFELY_COMPLETE_DAILY_ACTIVITIES: YES
TOILETING: INDEPENDENT
PATIENT'S MEMORY ADEQUATE TO SAFELY COMPLETE DAILY ACTIVITIES?: YES
BATHING: INDEPENDENT
LACK_OF_TRANSPORTATION: PATIENT DECLINED
LACK_OF_TRANSPORTATION: PATIENT DECLINED
FEEDING YOURSELF: INDEPENDENT
HEARING - LEFT EAR: FUNCTIONAL
ADEQUATE_TO_COMPLETE_ADL: YES
GROOMING: INDEPENDENT

## 2025-05-16 ASSESSMENT — COLUMBIA-SUICIDE SEVERITY RATING SCALE - C-SSRS
1. IN THE PAST MONTH, HAVE YOU WISHED YOU WERE DEAD OR WISHED YOU COULD GO TO SLEEP AND NOT WAKE UP?: NO
6. HAVE YOU EVER DONE ANYTHING, STARTED TO DO ANYTHING, OR PREPARED TO DO ANYTHING TO END YOUR LIFE?: NO
2. HAVE YOU ACTUALLY HAD ANY THOUGHTS OF KILLING YOURSELF?: NO

## 2025-05-16 ASSESSMENT — PATIENT HEALTH QUESTIONNAIRE - PHQ9
SUM OF ALL RESPONSES TO PHQ9 QUESTIONS 1 & 2: 0
1. LITTLE INTEREST OR PLEASURE IN DOING THINGS: NOT AT ALL
2. FEELING DOWN, DEPRESSED OR HOPELESS: NOT AT ALL

## 2025-05-16 ASSESSMENT — COGNITIVE AND FUNCTIONAL STATUS - GENERAL
PATIENT BASELINE BEDBOUND: NO
DAILY ACTIVITIY SCORE: 24
MOBILITY SCORE: 24

## 2025-05-16 ASSESSMENT — PAIN DESCRIPTION - LOCATION
LOCATION: NECK

## 2025-05-16 ASSESSMENT — LIFESTYLE VARIABLES
HOW OFTEN DO YOU HAVE 6 OR MORE DRINKS ON ONE OCCASION: PATIENT DECLINED
HOW MANY STANDARD DRINKS CONTAINING ALCOHOL DO YOU HAVE ON A TYPICAL DAY: PATIENT DECLINED
AUDIT-C TOTAL SCORE: -1
AUDIT-C TOTAL SCORE: -1
HOW OFTEN DO YOU HAVE A DRINK CONTAINING ALCOHOL: PATIENT DECLINED
SKIP TO QUESTIONS 9-10: 0

## 2025-05-16 ASSESSMENT — PAIN DESCRIPTION - ORIENTATION
ORIENTATION: MID
ORIENTATION: MID

## 2025-05-16 NOTE — CARE PLAN
The patient's goals for the shift include pain free    The clinical goals for the shift include see plan of care

## 2025-05-16 NOTE — OP NOTE
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] Post-op Diagnosis     * Papillary carcinoma of thyroid [C73]     * Metastasis from thyroid cancer (Multi) [C79.9, C73]     Procedures  DISSECTION, NECK  28856 - [place] CERVICAL LYMPHADEC MODIFIED RADICAL NECK [facility]  Bilateral central and lateral neck dissection    Surgeons      * Kelvin Faustin - Primary    Resident/Fellow/Other Assistant:  Surgeons and Role:  * No surgeons found with a matching role *    Staff:   Circulator: Simi  Scrub Person: Tia Mahmood Scrub: Tia    Anesthesia Staff: Anesthesiologist: Aris Armenta MD  C-AA: LAURITA Romo    Procedure Summary  Anesthesia: General  ASA: ASA status not filed in the log.  Estimated Blood Loss: 25 mL  Intra-op Medications:   Administrations occurring from 0730 to 1400 on 25:   Medication Name Total Dose   lidocaine-epinephrine (Xylocaine W/EPI) 1 %-1:100,000 injection 12 mL   HYDROmorphone (Dilaudid) injection 0.5 mg 1 mg   HYDROmorphone (Dilaudid) injection 1 mg 1 mg   oxygen (O2) therapy 200 L   ceFAZolin (Ancef) IVPB 2 g/100 mL D5 (premix) 4 g   dexAMETHasone (Decadron) PF injection 10 mg/mL 10 mg   fentaNYL (Sublimaze) injection 50 mcg/mL 100 mcg   LR bolus Cannot be calculated   LR bolus Cannot be calculated   lidocaine PF (Xylocaine-MPF) local injection 2 % 100 mg   methadone (Dolophine) tablet 20 mg   midazolam (Versed) injection 1 mg/mL 2 mg   ondansetron (Zofran) 2 mg/mL injection 4 mg   phenylephrine (Steve-Synephrine) 10 mg/250 mL NS (40 mcg/mL) infusion 20.32 mg   phenylephrine 100 mcg/mL syringe 10 mL (prefilled) 650 mcg   propofol (Diprivan) injection 10 mg/mL 300 mg   remifentanil (Ultiva) 1,000 mcg in sodium chloride 0.9% 50 mL (20 mcg/mL)  infusion 1.76 mg   succinylcholine 100 mg/5 mL SYRINGE 100 mg              Anesthesia Record               Intraprocedure I/O Totals          Intake    LR bolus 1000.00 mL    Remifentanil Drip 0.00 mL    The total shown is the total volume documented since Anesthesia Start was filed.    Phenylephrine Drip 0.00 mL    The total shown is the total volume documented since Anesthesia Start was filed.    Total Intake 1000 mL       Output    Urine 500 mL    Total Output 500 mL       Net    Net Volume 500 mL          Specimen:   ID Type Source Tests Collected by Time   1 : RIGHT PARATRACHEAL MASS Tissue SOFT TISSUE MASS RESECTION SURGICAL PATHOLOGY EXAM Kelvin Faustin MD 5/16/2025 0919   2 : LEFT CENTRAL NECK DISSECTION Tissue NECK DISSECTION LEFT (SPECIFY LEVELS) SURGICAL PATHOLOGY EXAM Kelvin Faustin MD 5/16/2025 0956   3 : LEFT NECK DISSECTION LEVELS 2-4 STITCH AT 2 Tissue NECK DISSECTION LEFT (SPECIFY LEVELS) SURGICAL PATHOLOGY EXAM Kelvin Faustin MD 5/16/2025 1151   4 : RIGHT NECK DISSECTION LEVELS 2-4 STITCH AT 2 Tissue NECK DISSECTION RIGHT (SPECIFY LEVELS) SURGICAL PATHOLOGY EXAM Kelvin Faustin MD 5/16/2025 1041                 Drains and/or Catheters:   Closed/Suction Drain Anterior;Left Neck Other (Comment) 10 Fr. (Active)       Closed/Suction Drain Anterior;Right Neck  10 Fr. (Active)       Urethral Catheter 16 Fr. (Active)       Findings:     Right central neck specimen with minimal fibrofatty tissue.  Palpable firm nodule was confirmed to be carcinoma on frozen  Left central neck specimen with at least 2 palpable firm nodes  Bilateral vagus nerve stimulated at 0.5    Indications: Simi Jefferson is an 60 y.o. female who is having surgery for Papillary carcinoma of thyroid [C73]  Metastasis from thyroid cancer (Multi) [C79.9, C73].     The patient was seen in the preoperative area. The risks, benefits, complications, treatment options, non-operative alternatives, expected recovery and outcomes were  discussed with the patient. The possibilities of reaction to medication, pulmonary aspiration, injury to surrounding structures, bleeding, recurrent infection, the need for additional procedures, failure to diagnose a condition, and creating a complication requiring transfusion or operation were discussed with the patient. The patient concurred with the proposed plan, giving informed consent.  The site of surgery was properly noted/marked if necessary per policy. The patient has been actively warmed in preoperative area. Preoperative antibiotics have been ordered and given within 1 hours of incision. Venous thrombosis prophylaxis have been ordered including bilateral sequential compression devices    Procedure Details: Patient was taken back to the operating room and laid supine on the table.  Timeout was performed, general anesthesia induced, patient was intubated with Nims endotracheal tube.  The circuit was connected and functioning appropriately.  The patient previously had a thyroidectomy so the prior scar was outlined to be excised from the planned incision.  A curvilinear incision was marked bilaterally.  The neck was then prepped and draped in sterile fashion.  A 15 blade was used to make skin incision down into the subcutaneous fat.  The prior skin incision scar was excised and discarded.  Subplatysmal flaps were elevated superiorly and inferiorly.  The lower portion of the sternocleidomastoid was identified bilaterally.  The lateral aspect of the strap musculature was also identified.  The strap musculature was split horizontally on both sides to access the central neck.  Bipolar cautery was used to release the fibrofatty tissue off the lateral aspect of the trachea on the right-hand side.  A palpable firm node was identified.  The recurrent laryngeal nerve was just lateral to this firm node.  The node and fibrofatty tissue below the node in the location between the recurrent nerve and the trachea was  removed down to the level of the great vessels.  There did not appear to be much fibrofatty tissue or other concerning lymph nodes.  Frozen section was used to confirm that this was consistent with carcinoma since FNA was previously obtained.  Next we completed the same dissection on the left central neck by releasing the fibrofatty tissue off the lateral aspect of the trachea.  The recurrent nerve was again identified and traced proximally down into the mediastinum.  All the fibrofatty tissue on the medial aspect of the recurrent nerve over to the tracheoesophageal groove and down to the great vessels was removed.  The specimen was larger and also had several firm palpable nodes.  The central neck was inspected on both sides and there is no other concerning lymphadenopathy or mass.  The nerve stimulator was turned down to 0.5 and the vagus nerve stimulated bilaterally.  Hemostasis was achieved in the central neck and a small amount of fibrillar was placed.  The strap muscles were reapproximated    Next I turned my attention to the right neck dissection.  I started by identifying my boundaries including the omohyoid, transverse cervical vessels, cervical rootlets on the medial and lateral aspect, the spinal accessory nerve, and the posterior belly of the digastric.  The fibrofatty tissue was released superior to the spinal accessory nerve allowing me to pull 2b inferiorly into the main neck specimen.  Next the specimen was released off the cervical rootlets down to the transverse cervical vessels.  The phrenic nerve was identified and preserved.  The palpable node that was previously biopsied level 4 was included in the specimen.  The specimen was oriented and sent for pathology.    Next I turned my attention to the left neck.  Again I started by identifying my boundaries including the posterior belly of the digastric, the lateral aspect of the omohyoid, spinal accessory nerve in the cervical rootlets on the lateral  aspect.  The mass was closely approximated with the sternocleidomastoid muscle and a cuff of muscle was taken with the specimen next the mass was carefully dissected away from the internal jugular vein and the vagus nerve.  This allowed me to identify the floor of the neck medially.  The specimen was retracted inferiorly and released off the floor of the neck down to the level of the transverse cervical vessels.  The specimen was oriented and sent for pathology.  The lateral neck was copiously irrigated bilaterally.  The vagus nerve was again checked bilaterally and stimulated at 0.5    Hemostasis was achieved.  There is no evidence of chyle leak.  A 10 flat fully perforated drain was placed on each side.  The incision was closed in layers using 3-0 Vicryl for the deep layer and subcuticular 4-0 Monocryl for the skin.  Mastisol and Steri-Strips were placed.  The patient was extubated without issue and taken to PACU in stable condition        Complications:  None; patient tolerated the procedure well.    Disposition: PACU - hemodynamically stable.  Condition: stable       Kelvin Faustin  Phone Number: [phone]

## 2025-05-16 NOTE — ANESTHESIA POSTPROCEDURE EVALUATION
Patient: Simi Jefferson    Procedure Summary       Date: 05/16/25 Room / Location: [facility] OR 03 / Virtual [facility] OR    Anesthesia Start: 0745 Anesthesia Stop: 1312    Procedure: DISSECTION, NECK (Bilateral: Neck) Diagnosis:       Papillary carcinoma of thyroid      Metastasis from thyroid cancer (Multi)      (Papillary carcinoma of thyroid [C73])      (Metastasis from thyroid cancer (Multi) [C79.9, C73])    Surgeons: Kelvin Faustin MD Responsible Provider: Aris Armenta MD    Anesthesia Type: general ASA Status: 3            Anesthesia Type: general    Vitals Value Taken Time   /81 05/16/25 14:45   Temp 36.3 °C (97.3 °F) 05/16/25 13:05   Pulse 97 05/16/25 14:53   Resp 13 05/16/25 14:53   SpO2 100 % 05/16/25 14:53   Vitals shown include unfiled device data.    Anesthesia Post Evaluation    Patient location during evaluation: PACU  Patient participation: complete - patient participated  Level of consciousness: sleepy but conscious, sedated, responsive to physical stimuli, responsive to verbal stimuli and responsive to light touch  Pain score: 7  Pain management: inadequate  Airway patency: patent  Two or more strategies used to mitigate risk of obstructive sleep apnea  Cardiovascular status: acceptable and hemodynamically stable  Respiratory status: acceptable, unassisted, nonlabored ventilation, spontaneous ventilation and nasal cannula  Hydration status: acceptable  Postoperative Nausea and Vomiting: none        No notable events documented.

## 2025-05-16 NOTE — ANESTHESIA PROCEDURE NOTES
Airway  Date/Time: 5/16/2025 7:53 AM  Reason: elective    Airway not difficult    Staffing  Performed: LAURITA   Authorized by: Aris Armenta MD    Performed by: LAURITA Romo  Patient location during procedure: OR    Patient Condition  Indications for airway management: anesthesia  Patient position: sniffing  Sedation level: deep     Final Airway Details   Preoxygenated: yes  Final airway type: endotracheal airway  Successful airway: ETT and NIM tube  Cuffed: yes   Successful intubation technique: video laryngoscopy  Adjuncts used in placement: intubating stylet  Endotracheal tube insertion site: oral  Blade: Roland  Blade size: #3  ETT size (mm): 7.0  Cormack-Lehane Classification: grade I - full view of glottis  Placement verified by: chest auscultation and capnometry   Measured from: lips  ETT to lips (cm): 21  Number of attempts at approach: 1

## 2025-05-16 NOTE — ANESTHESIA PROCEDURE NOTES
Peripheral IV  Date/Time: 5/16/2025 8:22 AM      Placement  Needle size: 18 G  Laterality: right  Location: antecubital  Site prep: alcohol  Technique: anatomical landmarks  Attempts: 1

## 2025-05-16 NOTE — ANESTHESIA PROCEDURE NOTES
Arterial Line:    Date/Time: 5/16/2025 8:22 AM    Staffing  Performed: attending   Authorized by: Aris Armenta MD    Performed by: LAURITA Romo    An arterial line was placed. Procedure performed using surface landmarks.in the OR for the following indication(s): continuous blood pressure monitoring and blood sampling needed.    A 20 gauge (size), 1 and 3/4 inch (length), Arrow (type) catheter was placed into the Left radial artery, secured by Tegaderm,   Seldinger technique used.  Events:  patient tolerated procedure well with no complications.

## 2025-05-16 NOTE — ANESTHESIA PREPROCEDURE EVALUATION
Patient: Simi Jefferson    Procedure Information       Anesthesia Start Date/Time: 05/16/25 0745    Procedure: DISSECTION, NECK (Bilateral) - Bilateral neck dissection    Location: [facility] OR 03 / [facility] OR    Surgeons: Kelvin Faustin MD            Relevant Problems   Endocrine   (+) Metastasis from thyroid cancer (Multi)   (+) Papillary carcinoma of thyroid   (+) Papillary thyroid carcinoma       Clinical information reviewed:   Tobacco  Allergies  Meds  Problems  Med Hx  Surg Hx  OB Status    Fam Hx  Soc Hx        NPO Detail:  NPO/Void Status  Carbohydrate Drink Given Prior to Surgery? : N  Date of Last Liquid: 05/16/25  Time of Last Liquid: 0330  Date of Last Solid: 05/15/25  Time of Last Solid: 0930  Last Intake Type: Clear fluids  Time of Last Void: 0530         Physical Exam    Airway  Mallampati: I  TM distance: >3 FB  Neck ROM: full  Mouth opening: 3 or more finger widths     Cardiovascular - normal exam  Rhythm: regular  Rate: normal     Dental - normal exam  Comments: Poor dentition, many teeth missing.     Pulmonary - normal examBreath sounds clear to auscultation     Abdominal Abdomen: soft  Bowel sounds: normal           Anesthesia Plan    History of general anesthesia?: yes  History of complications of general anesthesia?: no    ASA 3     general     The patient is not a current smoker.  Patient was previously instructed to abstain from smoking on day of procedure.  Patient did not smoke on day of procedure.  Education provided regarding risk of obstructive sleep apnea.  intravenous induction   Postoperative pain plan includes opioids.  Anesthetic plan and risks discussed with patient.  Use of blood products discussed with patient who consented to blood products.    Plan discussed with CAA.

## 2025-05-17 LAB
ALBUMIN SERPL BCP-MCNC: 4.2 G/DL (ref 3.4–5)
ANION GAP SERPL CALC-SCNC: 12 MMOL/L (ref 10–20)
BUN SERPL-MCNC: 13 MG/DL (ref 6–23)
CALCIUM SERPL-MCNC: 8.8 MG/DL (ref 8.6–10.3)
CHLORIDE SERPL-SCNC: 99 MMOL/L (ref 98–107)
CO2 SERPL-SCNC: 32 MMOL/L (ref 21–32)
CREAT SERPL-MCNC: 0.8 MG/DL (ref 0.5–1.05)
EGFRCR SERPLBLD CKD-EPI 2021: 85 ML/MIN/1.73M*2
GLUCOSE SERPL-MCNC: 95 MG/DL (ref 74–99)
MAGNESIUM SERPL-MCNC: 2.12 MG/DL (ref 1.6–2.4)
PHOSPHATE SERPL-MCNC: 5.7 MG/DL (ref 2.5–4.9)
POTASSIUM SERPL-SCNC: 4.2 MMOL/L (ref 3.5–5.3)
SODIUM SERPL-SCNC: 139 MMOL/L (ref 136–145)

## 2025-05-17 PROCEDURE — 36415 COLL VENOUS BLD VENIPUNCTURE: CPT | Performed by: OTOLARYNGOLOGY

## 2025-05-17 PROCEDURE — 2500000001 HC RX 250 WO HCPCS SELF ADMINISTERED DRUGS (ALT 637 FOR MEDICARE OP): Performed by: OTOLARYNGOLOGY

## 2025-05-17 PROCEDURE — 80069 RENAL FUNCTION PANEL: CPT | Performed by: OTOLARYNGOLOGY

## 2025-05-17 PROCEDURE — 2500000002 HC RX 250 W HCPCS SELF ADMINISTERED DRUGS (ALT 637 FOR MEDICARE OP, ALT 636 FOR OP/ED): Performed by: OTOLARYNGOLOGY

## 2025-05-17 PROCEDURE — 83735 ASSAY OF MAGNESIUM: CPT | Performed by: OTOLARYNGOLOGY

## 2025-05-17 PROCEDURE — 2500000001 HC RX 250 WO HCPCS SELF ADMINISTERED DRUGS (ALT 637 FOR MEDICARE OP): Performed by: NURSE PRACTITIONER

## 2025-05-17 PROCEDURE — 2500000004 HC RX 250 GENERAL PHARMACY W/ HCPCS (ALT 636 FOR OP/ED): Performed by: OTOLARYNGOLOGY

## 2025-05-17 PROCEDURE — 7100000011 HC EXTENDED STAY RECOVERY HOURLY - NURSING UNIT

## 2025-05-17 RX ORDER — HYDROXYZINE HYDROCHLORIDE 25 MG/1
25 TABLET, FILM COATED ORAL ONCE
Status: COMPLETED | OUTPATIENT
Start: 2025-05-17 | End: 2025-05-17

## 2025-05-17 RX ORDER — OXYCODONE HYDROCHLORIDE 10 MG/1
10 TABLET ORAL EVERY 4 HOURS PRN
Status: DISPENSED | OUTPATIENT
Start: 2025-05-17

## 2025-05-17 RX ADMIN — OXYCODONE HYDROCHLORIDE 10 MG: 10 TABLET ORAL at 17:32

## 2025-05-17 RX ADMIN — DOCUSATE SODIUM 100 MG: 100 CAPSULE, LIQUID FILLED ORAL at 20:52

## 2025-05-17 RX ADMIN — ATORVASTATIN CALCIUM 20 MG: 20 TABLET, FILM COATED ORAL at 20:52

## 2025-05-17 RX ADMIN — OXYCODONE HYDROCHLORIDE 5 MG: 5 TABLET ORAL at 03:48

## 2025-05-17 RX ADMIN — OXYCODONE HYDROCHLORIDE 10 MG: 10 TABLET ORAL at 21:33

## 2025-05-17 RX ADMIN — TRAMADOL HYDROCHLORIDE 50 MG: 50 TABLET, COATED ORAL at 01:18

## 2025-05-17 RX ADMIN — ACETAMINOPHEN 650 MG: 325 TABLET ORAL at 08:39

## 2025-05-17 RX ADMIN — OXYCODONE HYDROCHLORIDE 5 MG: 5 TABLET ORAL at 08:39

## 2025-05-17 RX ADMIN — OXYCODONE HYDROCHLORIDE 5 MG: 5 TABLET ORAL at 13:37

## 2025-05-17 RX ADMIN — ACETAMINOPHEN 650 MG: 325 TABLET ORAL at 17:32

## 2025-05-17 RX ADMIN — TRAMADOL HYDROCHLORIDE 50 MG: 50 TABLET, COATED ORAL at 22:54

## 2025-05-17 RX ADMIN — CEFAZOLIN SODIUM 2 G: 2 INJECTION, SOLUTION INTRAVENOUS at 01:16

## 2025-05-17 RX ADMIN — LEVOTHYROXINE SODIUM 137 MCG: 0.14 TABLET ORAL at 06:46

## 2025-05-17 RX ADMIN — TRAMADOL HYDROCHLORIDE 50 MG: 50 TABLET, COATED ORAL at 15:05

## 2025-05-17 RX ADMIN — HYDROXYZINE HYDROCHLORIDE 25 MG: 25 TABLET, FILM COATED ORAL at 20:52

## 2025-05-17 RX ADMIN — VENLAFAXINE HYDROCHLORIDE 37.5 MG: 37.5 CAPSULE, EXTENDED RELEASE ORAL at 20:52

## 2025-05-17 RX ADMIN — DOCUSATE SODIUM 100 MG: 100 CAPSULE, LIQUID FILLED ORAL at 08:39

## 2025-05-17 ASSESSMENT — PAIN DESCRIPTION - LOCATION
LOCATION: NECK

## 2025-05-17 ASSESSMENT — PAIN - FUNCTIONAL ASSESSMENT
PAIN_FUNCTIONAL_ASSESSMENT: 0-10

## 2025-05-17 ASSESSMENT — PAIN SCALES - GENERAL
PAINLEVEL_OUTOF10: 9
PAINLEVEL_OUTOF10: 7
PAINLEVEL_OUTOF10: 6
PAINLEVEL_OUTOF10: 7
PAINLEVEL_OUTOF10: 9
PAINLEVEL_OUTOF10: 8
PAINLEVEL_OUTOF10: 7
PAINLEVEL_OUTOF10: 6
PAINLEVEL_OUTOF10: 8
PAINLEVEL_OUTOF10: 6
PAINLEVEL_OUTOF10: 7

## 2025-05-17 ASSESSMENT — PAIN DESCRIPTION - ORIENTATION
ORIENTATION: ANTERIOR
ORIENTATION: ANTERIOR

## 2025-05-17 NOTE — PROGRESS NOTES
"Simi Jefferson is a 60 y.o. female on day 0 of admission presenting with Papillary thyroid carcinoma.    Subjective   No major issues overnight  Pain well-controlled on current regimen  Drain output decreasing tolerating p.o. intake         Objective     Neck flat and soft  Steri-Strips in place  Drains holding suction  Mild weakness of left lower lip    Last Recorded Vitals  Blood pressure 149/78, pulse 80, temperature 36.7 °C (98.1 °F), temperature source Temporal, resp. rate 16, height 1.74 m (5' 8.5\"), weight 80.3 kg (177 lb 0.5 oz), SpO2 99%.  Intake/Output last 3 Shifts:  I/O last 3 completed shifts:  In: 2545.5 (31.7 mL/kg) [I.V.:595.5 (7.4 mL/kg); IV Piggyback:1950]  Out: 565 (7 mL/kg) [Urine:500 (0.2 mL/kg/hr); Drains:65]  Weight: 80.3 kg     Relevant Results                              Assessment & Plan  Papillary thyroid carcinoma    Papillary carcinoma of thyroid    Metastasis from thyroid cancer (Multi)    Postop day 1 status post bilateral central and lateral neck dissection for metastatic papillary thyroid cancer    -Postoperative parathyroid hormone borderline.  Need to monitor postoperative calcium and phosphorus.  Next draw will be obtained in mid morning  - Patient is at risk for airway compromise due to extent of disease.  Drain output has been minimal.  She will remain inpatient to document decrease in drain output monitor airway symptoms  - Pain regimen      I spent 25 minutes in the professional and overall care of this patient.      Kelvin Faustin MD    "

## 2025-05-17 NOTE — CARE PLAN
Problem: Pain  Goal: Takes deep breaths with improved pain control throughout the shift  Outcome: Progressing  Goal: Turns in bed with improved pain control throughout the shift  Outcome: Progressing  Goal: Walks with improved pain control throughout the shift  Outcome: Progressing  Goal: Performs ADL's with improved pain control throughout shift  Outcome: Progressing  Goal: Participates in PT with improved pain control throughout the shift  Outcome: Progressing  Goal: Free from opioid side effects throughout the shift  Outcome: Progressing  Goal: Free from acute confusion related to pain meds throughout the shift  Outcome: Progressing     Problem: Pain - Adult  Goal: Verbalizes/displays adequate comfort level or baseline comfort level  Outcome: Progressing     Problem: Safety - Adult  Goal: Free from fall injury  Outcome: Progressing     Problem: Discharge Planning  Goal: Discharge to home or other facility with appropriate resources  Outcome: Progressing     Problem: Chronic Conditions and Co-morbidities  Goal: Patient's chronic conditions and co-morbidity symptoms are monitored and maintained or improved  Outcome: Progressing     Problem: Nutrition  Goal: Nutrient intake appropriate for maintaining nutritional needs  Outcome: Progressing   The patient's goals for the shift include      The clinical goals for the shift include Pt will have adequate pain management through end of shift; progressing

## 2025-05-18 VITALS
RESPIRATION RATE: 18 BRPM | OXYGEN SATURATION: 100 % | HEIGHT: 69 IN | BODY MASS INDEX: 26.22 KG/M2 | TEMPERATURE: 98.1 F | SYSTOLIC BLOOD PRESSURE: 148 MMHG | DIASTOLIC BLOOD PRESSURE: 92 MMHG | WEIGHT: 177.03 LBS | HEART RATE: 78 BPM

## 2025-05-18 PROCEDURE — 2500000001 HC RX 250 WO HCPCS SELF ADMINISTERED DRUGS (ALT 637 FOR MEDICARE OP): Performed by: OTOLARYNGOLOGY

## 2025-05-18 PROCEDURE — 7100000011 HC EXTENDED STAY RECOVERY HOURLY - NURSING UNIT

## 2025-05-18 PROCEDURE — 2500000002 HC RX 250 W HCPCS SELF ADMINISTERED DRUGS (ALT 637 FOR MEDICARE OP, ALT 636 FOR OP/ED): Performed by: OTOLARYNGOLOGY

## 2025-05-18 RX ADMIN — TRAMADOL HYDROCHLORIDE 50 MG: 50 TABLET, COATED ORAL at 11:38

## 2025-05-18 RX ADMIN — TRAMADOL HYDROCHLORIDE 50 MG: 50 TABLET, COATED ORAL at 04:58

## 2025-05-18 RX ADMIN — DOCUSATE SODIUM 100 MG: 100 CAPSULE, LIQUID FILLED ORAL at 22:56

## 2025-05-18 RX ADMIN — OXYCODONE HYDROCHLORIDE 10 MG: 10 TABLET ORAL at 14:36

## 2025-05-18 RX ADMIN — ACETAMINOPHEN 650 MG: 325 TABLET ORAL at 13:22

## 2025-05-18 RX ADMIN — ACETAMINOPHEN 650 MG: 325 TABLET ORAL at 19:21

## 2025-05-18 RX ADMIN — OXYCODONE HYDROCHLORIDE 10 MG: 10 TABLET ORAL at 23:10

## 2025-05-18 RX ADMIN — DOCUSATE SODIUM 100 MG: 100 CAPSULE, LIQUID FILLED ORAL at 10:07

## 2025-05-18 RX ADMIN — OXYCODONE HYDROCHLORIDE 10 MG: 10 TABLET ORAL at 19:19

## 2025-05-18 RX ADMIN — OXYCODONE HYDROCHLORIDE 10 MG: 10 TABLET ORAL at 01:42

## 2025-05-18 RX ADMIN — OXYCODONE HYDROCHLORIDE 10 MG: 10 TABLET ORAL at 10:06

## 2025-05-18 RX ADMIN — OXYCODONE HYDROCHLORIDE 10 MG: 10 TABLET ORAL at 06:08

## 2025-05-18 RX ADMIN — VENLAFAXINE HYDROCHLORIDE 37.5 MG: 37.5 CAPSULE, EXTENDED RELEASE ORAL at 22:56

## 2025-05-18 RX ADMIN — LEVOTHYROXINE SODIUM 137 MCG: 0.14 TABLET ORAL at 06:07

## 2025-05-18 RX ADMIN — TRAMADOL HYDROCHLORIDE 50 MG: 50 TABLET, COATED ORAL at 18:06

## 2025-05-18 RX ADMIN — ATORVASTATIN CALCIUM 20 MG: 20 TABLET, FILM COATED ORAL at 22:56

## 2025-05-18 ASSESSMENT — PAIN - FUNCTIONAL ASSESSMENT
PAIN_FUNCTIONAL_ASSESSMENT: 0-10

## 2025-05-18 ASSESSMENT — PAIN SCALES - GENERAL
PAINLEVEL_OUTOF10: 7
PAINLEVEL_OUTOF10: 8
PAINLEVEL_OUTOF10: 6
PAINLEVEL_OUTOF10: 5 - MODERATE PAIN
PAINLEVEL_OUTOF10: 7
PAINLEVEL_OUTOF10: 6
PAINLEVEL_OUTOF10: 7
PAINLEVEL_OUTOF10: 7
PAINLEVEL_OUTOF10: 6
PAINLEVEL_OUTOF10: 6
PAINLEVEL_OUTOF10: 5 - MODERATE PAIN
PAINLEVEL_OUTOF10: 6

## 2025-05-18 ASSESSMENT — PAIN DESCRIPTION - LOCATION
LOCATION: NECK

## 2025-05-18 ASSESSMENT — PAIN DESCRIPTION - DESCRIPTORS
DESCRIPTORS: ACHING

## 2025-05-18 ASSESSMENT — PAIN DESCRIPTION - ORIENTATION
ORIENTATION: ANTERIOR

## 2025-05-18 NOTE — PROGRESS NOTES
"Simi Jefferson is a 60 y.o. female on day 0 of admission presenting with Papillary thyroid carcinoma.    Subjective   No major issues overnight  Pain well-controlled on current regimen  Drain output remains too high for drain removal         Objective     Neck flat and soft  Steri-Strips in place  Drains holding suction  Mild weakness of left lower lip    Last Recorded Vitals  Blood pressure 133/80, pulse 80, temperature 36.7 °C (98.1 °F), temperature source Temporal, resp. rate 16, height 1.74 m (5' 8.5\"), weight 80.3 kg (177 lb 0.5 oz), SpO2 96%.  Intake/Output last 3 Shifts:  I/O last 3 completed shifts:  In: 2545.5 (31.7 mL/kg) [I.V.:595.5 (7.4 mL/kg); IV Piggyback:1950]  Out: 686 (8.5 mL/kg) [Urine:501 (0.2 mL/kg/hr); Drains:185]  Weight: 80.3 kg     Relevant Results                              Assessment & Plan  Papillary thyroid carcinoma    Papillary carcinoma of thyroid    Metastasis from thyroid cancer (Multi)    Postop day 2 status post bilateral central and lateral neck dissection for metastatic papillary thyroid cancer    -Postoperative parathyroid hormone borderline.  Calcium has remained stable. Asymptomatic  - Patient is at risk for airway compromise due to extent of disease/dissection.  Drain output remains too high for drain removal and she will be at risk for seroma if removed.  She will remain inpatient to document decrease in drain output monitor airway symptoms. Likely discharge tomorrow  - Pain regimen adjusted due to uncontrolled pain yesterday. Current regimen seems to be working      I spent 25 minutes in the professional and overall care of this patient.      Kelvin Faustin MD    "

## 2025-05-18 NOTE — CARE PLAN
The patient's goals for the shift include      The clinical goals for the shift include pain will be managed    Over the shift, the patient did  make progress toward the following goals.     Resting in long intervals between pain medications-reports that it lets her sleep.    Up ad severo to the bathroom.     Swallowing without any difficulties.      Problem: Pain  Goal: Turns in bed with improved pain control throughout the shift  Outcome: Progressing     Problem: Pain  Goal: Walks with improved pain control throughout the shift  Outcome: Progressing     Problem: Pain  Goal: Free from opioid side effects throughout the shift  Outcome: Progressing     Problem: Safety - Adult  Goal: Free from fall injury  Outcome: Progressing

## 2025-05-18 NOTE — CARE PLAN
The patient's goals for the shift include      The clinical goals for the shift include pain control & to ambulate in the halls    Over the shift, the patient did make progress toward the following goals. Barriers to progression include   Problem: Pain  Goal: Takes deep breaths with improved pain control throughout the shift  Outcome: Progressing  Goal: Turns in bed with improved pain control throughout the shift  Outcome: Progressing  Goal: Walks with improved pain control throughout the shift  Outcome: Progressing  Goal: Performs ADL's with improved pain control throughout shift  Outcome: Progressing  Goal: Participates in PT with improved pain control throughout the shift  Outcome: Progressing  Goal: Free from opioid side effects throughout the shift  Outcome: Progressing  Goal: Free from acute confusion related to pain meds throughout the shift  Outcome: Progressing   . Recommendations to address these barriers include   Problem: Pain - Adult  Goal: Verbalizes/displays adequate comfort level or baseline comfort level  Outcome: Progressing     Problem: Safety - Adult  Goal: Free from fall injury  Outcome: Progressing     Problem: Discharge Planning  Goal: Discharge to home or other facility with appropriate resources  Outcome: Progressing   .

## 2025-05-19 ENCOUNTER — PHARMACY VISIT (OUTPATIENT)
Dept: PHARMACY | Facility: CLINIC | Age: 60
End: 2025-05-19
Payer: COMMERCIAL

## 2025-05-19 VITALS
DIASTOLIC BLOOD PRESSURE: 83 MMHG | HEIGHT: 69 IN | OXYGEN SATURATION: 97 % | HEART RATE: 82 BPM | BODY MASS INDEX: 26.22 KG/M2 | WEIGHT: 177.03 LBS | TEMPERATURE: 97.5 F | SYSTOLIC BLOOD PRESSURE: 142 MMHG | RESPIRATION RATE: 16 BRPM

## 2025-05-19 PROBLEM — C77.0 METASTASIS TO CERVICAL LYMPH NODE: Status: ACTIVE | Noted: 2025-05-19

## 2025-05-19 LAB — HOLD SPECIMEN: 293

## 2025-05-19 PROCEDURE — 2500000002 HC RX 250 W HCPCS SELF ADMINISTERED DRUGS (ALT 637 FOR MEDICARE OP, ALT 636 FOR OP/ED): Performed by: OTOLARYNGOLOGY

## 2025-05-19 PROCEDURE — 2500000001 HC RX 250 WO HCPCS SELF ADMINISTERED DRUGS (ALT 637 FOR MEDICARE OP): Performed by: OTOLARYNGOLOGY

## 2025-05-19 PROCEDURE — RXMED WILLOW AMBULATORY MEDICATION CHARGE

## 2025-05-19 PROCEDURE — 7100000011 HC EXTENDED STAY RECOVERY HOURLY - NURSING UNIT

## 2025-05-19 RX ORDER — OXYCODONE AND ACETAMINOPHEN 5; 325 MG/1; MG/1
1-2 TABLET ORAL EVERY 6 HOURS PRN
Qty: 24 TABLET | Refills: 0 | Status: SHIPPED | OUTPATIENT
Start: 2025-05-19 | End: 2025-05-23 | Stop reason: DRUGHIGH

## 2025-05-19 RX ORDER — DOCUSATE SODIUM 100 MG/1
100 CAPSULE, LIQUID FILLED ORAL 2 TIMES DAILY
Qty: 60 CAPSULE | Refills: 0 | Status: SHIPPED | OUTPATIENT
Start: 2025-05-19

## 2025-05-19 RX ADMIN — ACETAMINOPHEN 650 MG: 325 TABLET ORAL at 03:51

## 2025-05-19 RX ADMIN — OXYCODONE HYDROCHLORIDE 10 MG: 10 TABLET ORAL at 08:18

## 2025-05-19 RX ADMIN — OXYCODONE HYDROCHLORIDE 10 MG: 10 TABLET ORAL at 12:27

## 2025-05-19 RX ADMIN — LEVOTHYROXINE SODIUM 137 MCG: 0.14 TABLET ORAL at 06:08

## 2025-05-19 RX ADMIN — OXYCODONE HYDROCHLORIDE 10 MG: 10 TABLET ORAL at 03:51

## 2025-05-19 RX ADMIN — ACETAMINOPHEN 650 MG: 325 TABLET ORAL at 16:31

## 2025-05-19 RX ADMIN — DOCUSATE SODIUM 100 MG: 100 CAPSULE, LIQUID FILLED ORAL at 08:23

## 2025-05-19 RX ADMIN — OXYCODONE HYDROCHLORIDE 10 MG: 10 TABLET ORAL at 16:31

## 2025-05-19 RX ADMIN — TRAMADOL HYDROCHLORIDE 50 MG: 50 TABLET, COATED ORAL at 14:05

## 2025-05-19 RX ADMIN — ACETAMINOPHEN 650 MG: 325 TABLET ORAL at 09:50

## 2025-05-19 RX ADMIN — TRAMADOL HYDROCHLORIDE 50 MG: 50 TABLET, COATED ORAL at 06:08

## 2025-05-19 ASSESSMENT — PAIN SCALES - GENERAL
PAINLEVEL_OUTOF10: 7
PAINLEVEL_OUTOF10: 9
PAINLEVEL_OUTOF10: 8
PAINLEVEL_OUTOF10: 8
PAINLEVEL_OUTOF10: 7
PAINLEVEL_OUTOF10: 8
PAINLEVEL_OUTOF10: 9
PAINLEVEL_OUTOF10: 7
PAINLEVEL_OUTOF10: 9
PAINLEVEL_OUTOF10: 7

## 2025-05-19 ASSESSMENT — PAIN - FUNCTIONAL ASSESSMENT
PAIN_FUNCTIONAL_ASSESSMENT: 0-10

## 2025-05-19 ASSESSMENT — PAIN DESCRIPTION - ORIENTATION
ORIENTATION: ANTERIOR
ORIENTATION: ANTERIOR

## 2025-05-19 ASSESSMENT — PAIN DESCRIPTION - DESCRIPTORS: DESCRIPTORS: THROBBING

## 2025-05-19 ASSESSMENT — PAIN DESCRIPTION - LOCATION
LOCATION: NECK

## 2025-05-19 NOTE — DISCHARGE INSTRUCTIONS
DEPARTMENT OF OTOLARYNGOLOGY (ENT)  DISCHARGE INSTRUCTIONS    C O N F I D E N T I A L   I N F O R M A T I O N  -------------------------------------------------------------------------------------------------------------------    Simi Jefferson  [id number]    The following is a brief overview of your hospitalization. Some of the information contained on this summary may be confidential.  This information should be kept in your records and should be shared with your regular doctor.    Admission Date:5/16/2025  6:16 AM  Discharge Date: No discharge date for patient encounter.    Disposition:  home    PRINCIPAL DIAGNOSIS (reason after study for this admission): papillary thyroid cancer    Physicians:   Dr. Faustin    Operations performed while hospitalized:   Bilateral neck dissection      Treatment/wound care:   Keep area(s) clean and dry.   It is okay to shower   Do not scrub wound(s), pat dry.    Do not submerge wound(s) in standing water until seen in followup (no tub bathing, swimming, or hot tubs).    Please visually inspect your wound(s) at least once daily.  If the wound(s) are in a difficult to see location, please use a mirror or have someone else assist with visual inspection.      If you see white bandages on your neck:  You have steri strips (small paper tape) along your incision. You can shower, pat dry; do not soak in a tub.  The steri strips will fall off on their own; do not peel them off.      Pain Control:    Adequate management:   percocet can be taken as prescribed as needed for breakthrough pain.    percocet is a narcotic, which can induce constipation.   Please take stool softeners when taking this medication to prevent constipation.    Activity after Discharge:   No heavy lifting, weight bearing as tolerated, no driving until mobility is returned to normal.   Do not submerge wound(s) in standing water for 7 days after surgery (no tub bathing, swimming, or hot tubs).   Do not drive or operate heavy  machinery while taking narcotic pain medications as these medications can alter perception, impair judgement, and slow reaction times.    Diet: regular    Call Provider if:  Breathing faster than normal  Breathing harder than normal or having retractions  Fever of 100.4 (38 C) or higher  Temperature is greater than 102 degrees  Chills  Drinking less than normal  Not being able to go 4-6 hours between albuterol treatments  Urinating less than normal, over 1 day  Acting very sleepy and difficult to awaken  Vomiting (throwing up) and not able to eat or drink for 12 hours  3 or more loose, watery bowel movements in 24 hours (diarrhea)  Any new concerning symptoms

## 2025-05-19 NOTE — PROGRESS NOTES
Spiritual Care Visit  Spiritual Care Request    Reason for Visit:  Routine Visit: Introduction     Request Received From:       Focus of Care:  Visited With: Patient         Refer to :          Spiritual Care Assessment    Spiritual Assessment:                      Care Provided:  Intended Effects: Promote sense of peace, Preserve dignity and respect, Meaning-making  Methods: Offer spiritual/P[employer]alism support  Interventions: Share words of hope and inspiration, Zanoni    Sense of Community and or Scientology Affiliation:  Jew         Addressed Needs/Concerns and/or [place] Through:          Outcome:        Advance Directives:         Spiritual Care Annotation    Annotation:  Patient shared her story.   prayed and she prayed for the  as well.

## 2025-05-19 NOTE — PROGRESS NOTES
05/19/25 1011   Discharge Planning   Support Systems Spouse/significant other   Type of Residence Private residence   Home or Post Acute Services None   Expected Discharge Disposition Home     Pt admitted for thyroid carcinoma. Pt lives with spouse and was independent PTA with no HHC or DME. PCP is Tano Pretty. Pt feels she is able to manage her health and understands her medications. Was able to drive and obtain meds. Pt plans to return home with no new discharge needs. Family will provide transport home.

## 2025-05-19 NOTE — PROGRESS NOTES
"Simi Jefferson is a 60 y.o. female on day 0 of admission presenting with Papillary thyroid carcinoma.    Subjective   No major issues overnight  Pain well-controlled on current regimen  Drain output significantly decreased over lat 24 hours         Objective     Neck flat and soft  Steri-Strips in place  Drains holding suction  Mild weakness of left lower lip    Drains removed    Last Recorded Vitals  Blood pressure 135/74, pulse 76, temperature 36.5 °C (97.7 °F), temperature source Temporal, resp. rate 16, height 1.74 m (5' 8.5\"), weight 80.3 kg (177 lb 0.5 oz), SpO2 98%.  Intake/Output last 3 Shifts:  I/O last 3 completed shifts:  In: 340 (4.2 mL/kg) [P.O.:340]  Out: 230 (2.9 mL/kg) [Drains:230]  Weight: 80.3 kg     Relevant Results                              Assessment & Plan  Papillary thyroid carcinoma    Papillary carcinoma of thyroid    Metastasis from thyroid cancer (Multi)    Postop day 3 status post bilateral central and lateral neck dissection for metastatic papillary thyroid cancer    -Postoperative parathyroid hormone borderline.  Calcium has remained stable. Asymptomatic  - Drains removed today  -discharge today      I spent 25 minutes in the professional and overall care of this patient.      Kelvin Faustin MD    "

## 2025-05-19 NOTE — NURSING NOTE
Pt discharged at this time in stable condition via  in private car. AVS reviewed. Belongings sent with Pt at time of discharge

## 2025-05-19 NOTE — CARE PLAN
The patient's goals for the shift include      The clinical goals for the shift include pain management    Over the shift, the patient did make progress toward the following goal of pain management with continuous use of PRN pain meds.

## 2025-05-19 NOTE — DISCHARGE SUMMARY
Discharge Diagnosis  Papillary thyroid carcinoma    Issues Requiring Follow-Up  Final pathology    Test Results Pending At Discharge  Pending Labs       Order Current Status    Extra Tubes In process    Lavender Top In process    Surgical Pathology Exam In process            Hospital Course   Patient was admitted after bilateral central and lateral neck dissection for metastatic thyroid cancer. Labs remained stable. No symptoms of hypocalcemia. Drain output decreased appropriately. She was discharged on postoperative day 3 after drain removal    Pertinent Physical Exam At Time of Discharge  Steri strips in place  Neck flat and soft    Home Medications     Medication List      START taking these medications     docusate sodium 100 mg capsule; Commonly known as: Colace; Take 1   capsule (100 mg) by mouth 2 times a day.   oxyCODONE-acetaminophen 5-325 mg tablet; Commonly known as: Percocet;   Take 1-2 tablets by mouth every 6 hours if needed for severe pain (7 - 10)   for up to 5 days.     CHANGE how you take these medications     levothyroxine 137 mcg tablet; Commonly known as: Synthroid, Levoxyl; 1   pill daily for 6 days then 1.5 on sunday; What changed: additional   instructions     CONTINUE taking these medications     atorvastatin 20 mg tablet; Commonly known as: Lipitor; Take 1 tablet (20   mg) by mouth once daily.   ibuprofen 800 mg tablet; Commonly known as: IBU; Take 1 tablet (800 mg)   by mouth 3 times a day as needed for mild pain (1 - 3) or moderate pain (4   - 6). take wtih food   tirzepatide (weight loss) 2.5 mg/0.5 mL solution   venlafaxine XR 37.5 mg 24 hr capsule; Commonly known as: Effexor-XR;   Take 1 capsule (37.5 mg) by mouth once daily. Do not crush or chew.       Outpatient Follow-Up  No future appointments.    Kelvin Faustin MD

## 2025-05-20 ENCOUNTER — TELEPHONE (OUTPATIENT)
Facility: CLINIC | Age: 60
End: 2025-05-20
Payer: COMMERCIAL

## 2025-05-20 NOTE — TELEPHONE ENCOUNTER
Spoke with Simi who wanted to review pain medication because she is having a lot of pain. She has a prescription for percocet 1-2 tabs q6h and has only been taking 1 tab, also using cool compress intermittently. I encouraged her to try taking 2 tabs and to rotate with ibuprofen. She is agreeable to plan, I will call her tomorrow to see if that has helped with pain management.

## 2025-05-21 ENCOUNTER — TELEPHONE (OUTPATIENT)
Facility: CLINIC | Age: 60
End: 2025-05-21
Payer: COMMERCIAL

## 2025-05-21 NOTE — TELEPHONE ENCOUNTER
Called Simi to check in and see how her pain is doing with adjustment to new regimen. She states it is a little better with adding the second percocet and that she has been taking 1 ibuprofen with it and not sure it is helping much. I encouraged rotating the percocet and ibuprofen and that she should increase the ibuprofen to 3tabs instead of 1. We reviewed time increments and she verbalized understanding. Will check in tomorrow.

## 2025-05-23 ENCOUNTER — OFFICE VISIT (OUTPATIENT)
Facility: CLINIC | Age: 60
End: 2025-05-23
Payer: COMMERCIAL

## 2025-05-23 VITALS — WEIGHT: 174 LBS | BODY MASS INDEX: 25.77 KG/M2 | HEIGHT: 69 IN

## 2025-05-23 DIAGNOSIS — G89.18 POSTOPERATIVE PAIN: ICD-10-CM

## 2025-05-23 DIAGNOSIS — C73 METASTASIS FROM THYROID CANCER (MULTI): ICD-10-CM

## 2025-05-23 DIAGNOSIS — C79.9 METASTASIS FROM THYROID CANCER (MULTI): ICD-10-CM

## 2025-05-23 PROCEDURE — 1036F TOBACCO NON-USER: CPT | Performed by: OTOLARYNGOLOGY

## 2025-05-23 PROCEDURE — 3008F BODY MASS INDEX DOCD: CPT | Performed by: OTOLARYNGOLOGY

## 2025-05-23 PROCEDURE — 99024 POSTOP FOLLOW-UP VISIT: CPT | Performed by: OTOLARYNGOLOGY

## 2025-05-23 RX ORDER — OXYCODONE AND ACETAMINOPHEN 5; 325 MG/1; MG/1
2 TABLET ORAL EVERY 4 HOURS PRN
Qty: 50 TABLET | Refills: 0 | Status: SHIPPED | OUTPATIENT
Start: 2025-05-23 | End: 2025-05-28

## 2025-05-23 ASSESSMENT — PAIN SCALES - GENERAL: PAINLEVEL_OUTOF10: 8

## 2025-05-23 NOTE — PROGRESS NOTES
ENT Outpatient Consultation    Chief Complaint: Neck mass.  History of thyroid cancer  History Of Present Illness  Simi Jefferson is a 60 y.o. female presents for evaluation of a neck mass and a prior history of thyroid cancer.  Patient originally had an FNA and March 2021 that showed FLUS.  Molecular testing was suspicious for malignancy and she underwent total thyroidectomy in May 2021.  She received radioactive iodine in June 2021 and her posttreatment whole-body scan was negative.  She noticed a left-sided neck mass approximately 6 months ago and at first thought it was swollen gland.  She recently had a CT scan of her neck that confirmed an enlarged pathologic lymph node on the left-hand side.  She is here today for evaluation.  She has not had any change in voice or swallowing.  Denies any throat pain.  From available records that her last thyroglobulin was in 2022 and at that time was 28.  Her antibodies were negative    4/24/25: Patient returns for follow-up to discuss upcoming surgery.  Biopsy of bilateral lateral neck nodes and central neck nodes returned positive for papillary thyroid cancer.  Also have new baseline thyroglobulin of 43.9.  She remains asymptomatic     Past Medical History  She has a past medical history of Cancer (Multi) (05/2021), Disease of thyroid gland (May 2022), Personal history of other diseases of the respiratory system (07/19/2013), Personal history of other diseases of the respiratory system (05/25/2016), and Personal history of other medical treatment.    Surgical History  She has a past surgical history that includes Thyroid surgery (May 2022) and Myomectomy.     Social History  She reports that she quit smoking about 39 years ago. Her smoking use included cigarettes. She started smoking about 40 years ago. She has been exposed to tobacco smoke. She has never used smokeless tobacco. She reports current alcohol use of about 2.0 standard drinks of alcohol per week. She reports  that she does not use drugs.    Family History  Family History   Problem Relation Name Age of Onset    Hypertension Mother      Cancer Father Estefani Crocker [place]     Cancer Maternal Grandmother Unique Quarles         Allergies  Patient has no known allergies.     Physical Exam:  CONSTITUTIONAL: No acute distress at rest.  Emotional during discussion  VOICE:  No hoarseness or other abnormality  RESPIRATION:  Breathing comfortably, no stridor  CV:  No clubbing/cyanosis/edema in hands  EYES:  EOM intact, sclera normal  NEURO:  Alert and oriented times 3, Cranial nerves II-XII grossly intact and symmetric bilaterally  HEAD AND FACE:  Symmetric facial features, no masses or lesions, sinuses non-tender to palpation  SALIVARY GLANDS:  Parotid and submandibular glands normal bilaterally  EARS:  Normal external ears, external auditory canals, and TMs to otoscopy, normal hearing to whispered voice.  NOSE:  External nose midline, anterior rhinoscopy is normal with limited visualization to the anterior aspect of the interior turbinates, no bleeding or drainage, no lesions  ORAL CAVITY/OROPHARYNX/LIPS:  Normal mucous membranes, normal floor of mouth/tongue/OP, no masses or lesions  PHARYNGEAL WALLS:  No masses or lesions  NECK/LYMPH: Palpable left-sided lymphadenopathy  SKIN: Thyroidectomy scar well-healed  PSYCH:  Alert and oriented with appropriate mood and affect         Last Recorded Vitals  There were no vitals taken for this visit.    Relevant Results      No results found for this or any previous visit (from the past 24 hours).    CT soft tissue neck w IV contrast    Result Date: 3/3/2025  Interpreted By:  Fuad Hernandez, STUDY: CT SOFT TISSUE NECK W IV CONTRAST;  3/3/2025 12:31 pm   INDICATION: Signs/Symptoms:anterior left neck mass, Sternoclidomastoid mid neck..   ,R22.1 Localized swelling, mass and lump, neck   COMPARISON: None.   ACCESSION NUMBER(S): [id number]   ORDERING CLINICIAN: LILLIANA TATE   TECHNIQUE: Axial CT  images of the neck were obtained.  The patient received 68 ML of Omnipaque 350 intravenous contrast agent. The images were reformatted in angled axial, coronal and sagittal planes.   FINDINGS: The left neck has a cystic mass lateral to and compressing the left jugular vein at the level of the carotid bifurcation with a large lobulated diffusely enhancing mass within the superior aspect of the cyst.   The entire mass is 3.4 x 3.6 x 6.9 cm. The diffusely enhancing lobulated mass superiorly is 2.4 x 3.7 x 3.1 cm.   The more inferior cystic portion has smooth thin rim extending inferiorly to the level of the thyroid gland. No connection with the thyroid gland is noted. The mass remains lateral to the jugular vein.   Oral Cavity, Pharynx and Larynx:  Evaluation oral cavity is limited by streak artifact from dental hardware.  The nasopharyngeal and oropharyngeal structures are unremarkable. The hypopharyngeal and laryngeal structures are unremarkable.   Retropharyngeal and Prevertebral Soft Tissues: Unremarkable.   Lymph nodes: There are few non specific bilateral neck nodes, probably reactive in etiology.   Neck vessels: Bilateral neck vessels are normal in course and caliber and appear patent.   Thyroid gland: The thyroid gland is unremarkable in size and appearance.   Parotid and submandibular glands: Bilateral parotid and submandibular glands are unremarkable in appearance.   Paranasal Sinuses and Mastoids: Visualized paranasal sinuses and bilateral mastoids are clear.   Visualized orbital structures are unremarkable.   Visualized upper lungs are clear.   Cervical spine has degenerative disc changes with vacuum phenomena and loss of disc height and mild endplate spurring.       The left neck has a partially cystic mass coursing along the lateral aspect of the jugular vein with a lobulated diffusely enhancing solid component superiorly. No associated adenopathy is noted.     MACRO: None   Signed by: Fuad Hernandez  3/3/2025 5:12 PM Dictation workstation:   [id number]      CT scan was personally reviewed.  This shows left-sided lymphadenopathy.  There is also some concern for lymphadenopathy in the central and right neck that is smaller in size    Assessment and Plan  60 y.o. female with history of thyroid cancer that was treated with surgery and radioactive iodine in 2021.  Patient presents today with lymphadenopathy and concern for possible metastatic thyroid cancer.  Patient is very anxious regarding FNA and would like to have it done under sedation.  She also has multiple sites that are concerning on the CT scan.  She was sent for FNA of bilateral neck nodes in the central and lateral neck all of which returned as positive    - Discussed bilateral neck dissections to include central lateral neck.  We discussed risks of bleeding, infection, numbness, cranial neuropathies, wound complications, chyle leak, hypoparathyroidism.  We discussed some of the intraoperative decision making regarding nerve sacrifice and short and long-term rehab.  We discussed expected hospital stay, drain placement, and other postoperative expectations.  All questions were answered    Kelvin Faustin MD

## 2025-06-06 DIAGNOSIS — C79.9 METASTASIS FROM THYROID CANCER (MULTI): Primary | ICD-10-CM

## 2025-06-06 DIAGNOSIS — C73 METASTASIS FROM THYROID CANCER (MULTI): Primary | ICD-10-CM

## 2025-06-10 ENCOUNTER — TELEPHONE (OUTPATIENT)
Dept: OTOLARYNGOLOGY | Facility: HOSPITAL | Age: 60
End: 2025-06-10
Payer: COMMERCIAL

## 2025-06-10 DIAGNOSIS — C77.0 METASTASIS TO CERVICAL LYMPH NODE: Primary | ICD-10-CM

## 2025-06-10 DIAGNOSIS — G89.18 ACUTE POSTOPERATIVE PAIN: ICD-10-CM

## 2025-06-10 RX ORDER — OXYCODONE AND ACETAMINOPHEN 5; 325 MG/1; MG/1
1-2 TABLET ORAL EVERY 4 HOURS PRN
Qty: 40 TABLET | Refills: 0 | Status: SHIPPED | OUTPATIENT
Start: 2025-06-10 | End: 2025-06-12 | Stop reason: SDUPTHER

## 2025-06-10 NOTE — TELEPHONE ENCOUNTER
Returned call to Simi who states she is still having quite a bit of pain and levels are consistent around 7-8/10. She is taking pin medication once or twice a day because she is afraid she will run out too quickly. She has also been taking ibuprofen, but once or twicce per day. We discussed getting on a more consistent regimen to better manage her pain and I will discuss with Dr. Faustin about a refill of pain medication. She also c/o mouth sores on the roof of her mouth, I am not sure if this is related to irritation from intubation or stress, but we discussed salt water rinses and diligent mouth care. Will also discuss this with Dr. Faustin and update her accordingly. She is agreeable to plan, all questions answered.

## 2025-06-11 ENCOUNTER — TELEPHONE (OUTPATIENT)
Facility: CLINIC | Age: 60
End: 2025-06-11

## 2025-06-11 ENCOUNTER — APPOINTMENT (OUTPATIENT)
Dept: HEMATOLOGY/ONCOLOGY | Facility: HOSPITAL | Age: 60
End: 2025-06-11
Payer: COMMERCIAL

## 2025-06-11 NOTE — TUMOR BOARD NOTE
Tumor Board Documentation    Simi Jefferson was presented by Dr. Faustin at our Tumor Board on 6/11/2025, which included representatives from Pathology, Surgical oncology, Head and neck (Endocrine & Endocrine surgery).    Simi currently presents as New patient initial presentation with history of the following treatments: Surgical interventions (Bilateral neck dissection and central neck).    Additionally, we reviewed previous medical and familial history, history of present illness, and recent lab results along with all available histopathologic and imaging studies. The tumor board considered available treatment options and made the following recommendations:   (HELD until path available).  Re-present next month.    The following procedures/referrals were also placed: No orders of the defined types were placed in this encounter.      Clinical Trial Status: None available     National site-specific guidelines were discussed with respect to the case.      Radha Rodrigues MD    Head & Neck Surgical Oncology & Reconstruction  Department of Otolaryngology - Head and Neck Surgery

## 2025-06-11 NOTE — TELEPHONE ENCOUNTER
Spoke with Simi to see how she is doing since speaking to her about pain regimen. She said she is feeling better and has been better about taking the pain meds. She is still having discomfort and difficulty with eating and the sores that are on the roof of her mouth. We discussed diet options. Also confirmed her updated appt for this Friday with Dr. Faustin. No other questions or concerns but encouraged her to reach out if she thought of anything.

## 2025-06-12 DIAGNOSIS — G89.18 ACUTE POSTOPERATIVE PAIN: ICD-10-CM

## 2025-06-12 DIAGNOSIS — C77.0 METASTASIS TO CERVICAL LYMPH NODE: ICD-10-CM

## 2025-06-12 RX ORDER — OXYCODONE AND ACETAMINOPHEN 5; 325 MG/1; MG/1
1-2 TABLET ORAL EVERY 4 HOURS PRN
Qty: 40 TABLET | Refills: 0 | Status: SHIPPED | OUTPATIENT
Start: 2025-06-12 | End: 2025-06-22

## 2025-06-13 ENCOUNTER — OFFICE VISIT (OUTPATIENT)
Facility: CLINIC | Age: 60
End: 2025-06-13
Payer: COMMERCIAL

## 2025-06-13 VITALS — HEIGHT: 68 IN | WEIGHT: 174 LBS | BODY MASS INDEX: 26.37 KG/M2

## 2025-06-13 DIAGNOSIS — K13.70 ORAL LESION: ICD-10-CM

## 2025-06-13 DIAGNOSIS — R29.898 SHOULDER WEAKNESS: ICD-10-CM

## 2025-06-13 LAB
LABORATORY COMMENT REPORT: NORMAL
Lab: NORMAL
PATH REPORT.FINAL DX SPEC: NORMAL
PATH REPORT.GROSS SPEC: NORMAL
PATH REPORT.RELEVANT HX SPEC: NORMAL
PATH REPORT.TOTAL CANCER: NORMAL
RESIDENT REVIEW: NORMAL

## 2025-06-13 PROCEDURE — 3008F BODY MASS INDEX DOCD: CPT | Performed by: OTOLARYNGOLOGY

## 2025-06-13 PROCEDURE — 99024 POSTOP FOLLOW-UP VISIT: CPT | Performed by: OTOLARYNGOLOGY

## 2025-06-13 PROCEDURE — 1036F TOBACCO NON-USER: CPT | Performed by: OTOLARYNGOLOGY

## 2025-06-13 ASSESSMENT — PATIENT HEALTH QUESTIONNAIRE - PHQ9
1. LITTLE INTEREST OR PLEASURE IN DOING THINGS: NOT AT ALL
SUM OF ALL RESPONSES TO PHQ9 QUESTIONS 1 AND 2: 0
2. FEELING DOWN, DEPRESSED OR HOPELESS: NOT AT ALL

## 2025-06-13 ASSESSMENT — PAIN SCALES - GENERAL: PAINLEVEL_OUTOF10: 0-NO PAIN

## 2025-06-13 NOTE — PROGRESS NOTES
ENT Outpatient Consultation    Chief Complaint: Neck mass.  History of thyroid cancer  History Of Present Illness  Simi Jefferson is a 60 y.o. female presents for evaluation of a neck mass and a prior history of thyroid cancer.  Patient originally had an FNA and March 2021 that showed FLUS.  Molecular testing was suspicious for malignancy and she underwent total thyroidectomy in May 2021.  She received radioactive iodine in June 2021 and her posttreatment whole-body scan was negative.  She noticed a left-sided neck mass approximately 6 months ago and at first thought it was swollen gland.  She recently had a CT scan of her neck that confirmed an enlarged pathologic lymph node on the left-hand side.  She is here today for evaluation.  She has not had any change in voice or swallowing.  Denies any throat pain.  From available records that her last thyroglobulin was in 2022 and at that time was 28.  Her antibodies were negative    4/24/25: Patient returns for follow-up to discuss upcoming surgery.  Biopsy of bilateral lateral neck nodes and central neck nodes returned positive for papillary thyroid cancer.  Also have new baseline thyroglobulin of 43.9.  She remains asymptomatic    5/23/25: Patient returns after bilateral neck dissection. Still has uncontrolled pain. She is taking pain medication every 4 hours     Past Medical History  She has a past medical history of Cancer (Multi) (05/2021), Disease of thyroid gland (May 2022), Personal history of other diseases of the respiratory system (07/19/2013), Personal history of other diseases of the respiratory system (05/25/2016), and Personal history of other medical treatment.    Surgical History  She has a past surgical history that includes Thyroid surgery (May 2022) and Myomectomy.     Social History  She reports that she quit smoking about 39 years ago. Her smoking use included cigarettes. She started smoking about 40 years ago. She has been exposed to tobacco smoke.  She has never used smokeless tobacco. She reports current alcohol use of about 2.0 standard drinks of alcohol per week. She reports that she does not use drugs.    Family History  Family History   Problem Relation Name Age of Onset    Hypertension Mother      Cancer Father Estefani [facility]     Cancer Maternal Grandmother Unique Quarles         Allergies  Patient has no known allergies.     Physical Exam:  Steri strips in place and these were removed. Incision is well approximated. No sign of infection or fluid collection         Last Recorded Vitals  There were no vitals taken for this visit.    Relevant Results      No results found for this or any previous visit (from the past 24 hours).    CT soft tissue neck w IV contrast    Result Date: 3/3/2025  Interpreted By:  Fuad Hernandez, STUDY: CT SOFT TISSUE NECK W IV CONTRAST;  3/3/2025 12:31 pm   INDICATION: Signs/Symptoms:anterior left neck mass, Sternoclidomastoid mid neck..   ,R22.1 Localized swelling, mass and lump, neck   COMPARISON: None.   ACCESSION NUMBER(S): [id number]   ORDERING CLINICIAN: LILLIANA TATE   TECHNIQUE: Axial CT images of the neck were obtained.  The patient received 68 ML of Omnipaque 350 intravenous contrast agent. The images were reformatted in angled axial, coronal and sagittal planes.   FINDINGS: The left neck has a cystic mass lateral to and compressing the left jugular vein at the level of the carotid bifurcation with a large lobulated diffusely enhancing mass within the superior aspect of the cyst.   The entire mass is 3.4 x 3.6 x 6.9 cm. The diffusely enhancing lobulated mass superiorly is 2.4 x 3.7 x 3.1 cm.   The more inferior cystic portion has smooth thin rim extending inferiorly to the level of the thyroid gland. No connection with the thyroid gland is noted. The mass remains lateral to the jugular vein.   Oral Cavity, Pharynx and Larynx:  Evaluation oral cavity is limited by streak artifact from dental hardware.  The nasopharyngeal  and oropharyngeal structures are unremarkable. The hypopharyngeal and laryngeal structures are unremarkable.   Retropharyngeal and Prevertebral Soft Tissues: Unremarkable.   Lymph nodes: There are few non specific bilateral neck nodes, probably reactive in etiology.   Neck vessels: Bilateral neck vessels are normal in course and caliber and appear patent.   Thyroid gland: The thyroid gland is unremarkable in size and appearance.   Parotid and submandibular glands: Bilateral parotid and submandibular glands are unremarkable in appearance.   Paranasal Sinuses and Mastoids: Visualized paranasal sinuses and bilateral mastoids are clear.   Visualized orbital structures are unremarkable.   Visualized upper lungs are clear.   Cervical spine has degenerative disc changes with vacuum phenomena and loss of disc height and mild endplate spurring.       The left neck has a partially cystic mass coursing along the lateral aspect of the jugular vein with a lobulated diffusely enhancing solid component superiorly. No associated adenopathy is noted.     MACRO: None   Signed by: Fuad Hernandez 3/3/2025 5:12 PM Dictation workstation:   [id number]      CT scan was personally reviewed.  This shows left-sided lymphadenopathy.  There is also some concern for lymphadenopathy in the central and right neck that is smaller in size    Assessment and Plan  60 y.o. female with history of thyroid cancer that was treated with surgery and radioactive iodine in 2021.  Patient presents today with lymphadenopathy and concern for possible metastatic thyroid cancer.  Patient is very anxious regarding FNA and would like to have it done under sedation.  She also has multiple sites that are concerning on the CT scan.  She was sent for FNA of bilateral neck nodes in the central and lateral neck all of which returned as positive. Now s/p bilateral neck dissection    - final path pending  -sent information to endocrinology to establish care  -she will plan to  follow up with me in a few weeks. If she hasn't had labs by that time will get updated thyroglobulin  -pain medication sent to pharmacy    Kelvin Faustin MD

## 2025-06-14 RX ORDER — PREDNISOLONE ORAL SOLUTION 15 MG/5ML
SOLUTION ORAL
Qty: 210 ML | Refills: 1 | Status: SHIPPED | OUTPATIENT
Start: 2025-06-14

## 2025-06-26 ENCOUNTER — CLINICAL SUPPORT (OUTPATIENT)
Dept: PHYSICAL THERAPY | Facility: CLINIC | Age: 60
End: 2025-06-26
Payer: COMMERCIAL

## 2025-06-26 DIAGNOSIS — R29.898 SHOULDER WEAKNESS: ICD-10-CM

## 2025-06-26 PROCEDURE — 97161 PT EVAL LOW COMPLEX 20 MIN: CPT | Mod: GP

## 2025-06-26 PROCEDURE — 97530 THERAPEUTIC ACTIVITIES: CPT | Mod: GP

## 2025-06-26 PROCEDURE — 97110 THERAPEUTIC EXERCISES: CPT | Mod: GP

## 2025-06-26 ASSESSMENT — PATIENT HEALTH QUESTIONNAIRE - PHQ9
2. FEELING DOWN, DEPRESSED OR HOPELESS: NOT AT ALL
1. LITTLE INTEREST OR PLEASURE IN DOING THINGS: NOT AT ALL
SUM OF ALL RESPONSES TO PHQ9 QUESTIONS 1 AND 2: 0

## 2025-06-26 NOTE — PROGRESS NOTES
Physical Therapy    Physical Therapy Evaluation and Treatment      Patient Name: Simi Jefferson  MRN: [id number]  Today's Date: 6/26/2025  Time Calculation  Start Time: 0715  Stop Time: 0805  Time Calculation (min): 50 min    Insurance - reviewed   Visit number: 1 (updated 06/26/25)   Payor: [employer] [facility] / Plan: [employer] / Product Type: *No Product type* /    40 PT/OT V PCY 0 USED NO AUTH NEEDED PAYS % DED MET   Referring Provider: Kelvin Faustin MD   Surgery: Dissection, Neck - Bilateral, 5/16/2025.  Days since surgery: 41       Assessment:      Simi Jefferson  is a 60 y.o. old patient who participated in a physical therapy evaluation today due to neck ROM deficits and shoulder weakness s/p Dissection, Neck - Bilateral on 5/16/2025 secondary to thyroid CA.  Simi's impairments include: postural deficits, decreased strength, decreased range of motion, soft tissue dysfunction, and decreased sensation.   Due to these impairments, she has the following functional limitations and participation restrictions: difficulty performing household activities, difficulty performing recreational activities, difficulty performing occupational activities, and increased time to complete ADLs/IADLs. Simi's clinical presentation is Stable and/or uncomplicated characteristics with the level of complexity being low. Simi's potential for rehab is good.  Simi instructed in scar mobilization and desensitization for HEP without incident but she does present with fear/sensitivity to this region.  Much time spent answering patient's questions in scope of PT about her condition, rationale for interventions.  Based on the current lymphedema evidence, she may benefit from evaluation by SLP as lymph nodes removed.  Skilled physical therapy services are appropriate and beneficial in order to achieve measurable and meaningful change in the objective tests and measures. Utilization of skilled physical therapy  services will aid in advancing her functional status and attaining her therapy-related goals. Simi verbalized understanding and is in agreement with all goals and plan of care.  Simi left session with all questions answered and no increase in symptoms.      Plan:  OP PT Plan  Treatment/Interventions: Cryotherapy, Education/ Instruction, Manual therapy, Neuromuscular re-education, Self care/ home management, Therapeutic activities, Therapeutic exercises (Precautions to manual: CA, TPR and MET OK.  No mobs or STM.)  PT Plan: Skilled PT  PT Frequency: 1 time per week  Duration: 8 weeks  Rehab Potential: Good  Plan of Care Agreement: Patient  Katherine Novak, SPT, participated during session. Siria Parikh, PT, DPT, NCS was present and directly supervised during PT evaluation. I, Siria Parikh, agree with assessment and plan.       Current Problem:   Problem List Items Addressed This Visit    None  Visit Diagnoses         Codes      Shoulder weakness     R29.898    Relevant Orders    Follow Up In Physical Therapy              Subjective    General:       Simi Jefferson  is a 60 y.o. female  presenting to the clinic with chief complaint of neck discomfort and shoulder weakness. Also reports that there are 4 things that are bringing her in today: menopause-like symptoms (sweating, heavy breathing), L arm weakness, intermittent twitching on the R side of her neck, and getting back to functioning. In terms of her arm weakness, she notices most difficulty with reaching overhead, such as when doing her hair, or when turning her arm in and out. She notices improvement in neck tension with massage from . In terms of her neck , she starts feeling the twitching when turning to the right and left. She also notes limitations in side bending and rotation following surgery. She currently continues to experience numbness from the top of her neck down to her shoulders. She is also experiencing menopause-like symptoms again  following surgery.     Simi Jefferson  reports symptoms began after her surgery last month (5/23/25)    Reports symptoms are better with pain medication and in bed    Reports neck symptoms and menopause like symptoms increase with stress    Simi Jefferson  denies:  diplopia, drop attacks, dysarthria, dysphagia, dizziness, unexpected weight loss within the past 4 weeks, and unexpected weight gain within the past 4 weeks    Simi Jefferson  confirms: numbness and tingling    Medical History Form: Reviewed (scanned into chart)    Living Environment: multi-level house, lives in the basement, lives with  (available to help as needed)    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[employer])     Prior Level of Function: Independent    Exercise: irregularly - used to work out a lot, unable to do as much following surgery    Functional Limitations: unable to move like she used to, feels fatigued overall    Pt goals for therapy:  be at least as good as she was before the surgery, get her mind back to where it used to be, have healthy healing of scar and nervous system    Precautions:  Fall Risk: None   Denies: Pacemaker, Diabetes, Hypertension, latex allergy, epilepsy/seizures, other known cardiac problems, other known neurologic problem, other known pulmonary problems, unexpected weight gain or loss, night sweats, night pain, diplopia, and drop attacks  Past Surgical history: thyroid surgery (2022), bilateral neck dissection (5/23/25) - pathology confirming multiple sites of metastasis with extranodal extension  Past Medical history: Cancer thyroid cancer, anemic    Pain:  0/10         Objective     Functional Assessments:  Transfers: Independent STS   Gait: Step length and milka WNL, no abnormalities noted  Bed mobility: Independent supine <> sit    Numbness/Tingling/Paresthesia: numbness reported throughout anterior neck to anterior shoulder    RANGE OF MOTION: ROM in sitting via goniometer in degrees, *  "indicates pain  Cervical Flexion: 20  Cervical Extension: 25  Cervical Rotation R/L:  46/35 (feels tight on left, turning to left induced twitching of R neck musculature)  Cervical Side Bend R/L: 19/21 (feels tight on left)  Shoulder Flexion (sitting) R/L: 180/145  Shoulder Flexion (supine) R/L: 180/180  Shoulder Abduction (sitting) R/L: 180/84  Shoulder Abduction (supine) R/L:  180/118  Shoulder ER (sitting): WNL bilaterally  Shoulder IR (sitting): WNL bilaterally    Strength/Myotomes: MMT in sitting, * indicates pain   **no pressure applied due to recent surgery in area  Shoulder Abduction (C5) R/L: observed >/= 3+/3-  Shoulder Flexion R/L: observed >/= 3+/3-  Shoulder ER: observed >/=  3+/3+  Shoulder IR: observed >/= 3+/3+  Elbow Flexion (C6) R/L: observed >/=  3+/3+  Elbow Extension R/L (C7): observed >/= 3+/3+    Palpation: Increased tenderness to palpation along bilateral rhomboids (specific trigger point on lower R rhomboid). Decreased mobility throughout scar on anterior neck, most notably on lateral ends    Outcome Measures:  Other Measures  Disability of Arm Shoulder Hand (DASH): 34  Neck Disability Index: 22     Treatments:    Therapeutic Exercise:  10 minutes    TrP release with tennis ball to rhomboid (HEP)  Educated to put tennis ball on spot that is most uncomfortable for her and holding there until she feels a release in muscle.  Attempted rhomboid stretch in doorway and 3 way child pose stretch but held as did not stretch her Trigger point    Therapeutic Activity: 15 minutes  Application of silicone to scar to lateral ends of scar along neck (mild keloiding on L superior incision)   Surgical incision was clean, dry and intact.  Significant education provided answering Simi's questions about why she may be experiencing shoulder weakness as well as \"menopausal\" like symptoms following her most recent surgery. Role of vagus nerve.  Education on the importance of completing desensitization and scar " massage to her neck scar so that she does not develop contractures that could limit neck movement long term.   Issued and reviewed desensitization for HEP  Also educated in scar mobilization but held from HEP as Simi fearful to touch incision.  Education on how to use silicone scar sheets and the purpose of using them to help with overall appearance and texture of her scar long-term.     EDUCATION:  Outpatient Education  Individual(s) Educated: Patient  Education Provided: Anatomy, Fall Risk, Home Exercise Program, Physiology, POC  Risk and Benefits Discussed with Patient/Caregiver/Other: yes  Patient/Caregiver Demonstrated Understanding: yes  Plan of Care Discussed and Agreed Upon: yes  Patient Response to Education: Patient/Caregiver Verbalized Understanding of Information  -Educated Simi  on the role of PT and PT POC  -Educated Simi regarding benefit and purpose of skilled PT services along with results of examination findings and how this correlates to their chief complaint.   -Answered Simi's questions in full.  -Educated Simi on relevant anatomy      Goals:      STG's (within 2 weeks)    Simi Jefferson will demonstrate independence,  excellent understanding of and report compliance with at least 3 exercises in her HEP to facilitate the learning process to maximize PT benefits and to facilitate independent rehab program upon discharge.    LTG's (by discharge)    Simi Jefferson will demonstrate improvement in active shoulder ROM against gravity to at least 160 degrees of flexion and 120 degrees of abduction so that she can style her hair without compensating with her right arm.    Simi Jefferson will demonstrate improvement in shoulder strength to at least 4/5 bilaterally so that she can lift her arm to perform overhead ADLs in the home, such as reaching for dishes in a cabinet, without difficulty.    Simi Jefferson will demonstrate neck rotation ROM of at least 60 degrees bilaterally so that she can turn  her head comfortably while driving and to show improvements in scar mobility.    Simi Jefferson will improve QuickDash score by >/= 9 points to demonstrate improvements in ability to use L arm to perform ADLs.  Baseline 6/26/2025 34 or 52.27%    Simi Jefferson will improve NDI score >/= 9 points to demonstrate improvements in ability to perform ADLs without discomfort and restriction. Baseline 6/26/2025 22/50 or 44%    Simi Jefferson will demonstrate independence,  excellent understanding of and report compliance with HEP to facilitate the learning process to maximize PT benefits and to facilitate independent rehab program upon discharge.    Time Calculation  Start Time: 0715  Stop Time: 0805  Time Calculation (min): 50 min  PT Evaluation Time Entry  PT Evaluation (Low) Time Entry: 25  PT Therapeutic Procedures Time Entry  Therapeutic Exercise Time Entry: 10  Therapeutic Activity Time Entry: 15

## 2025-06-27 ENCOUNTER — APPOINTMENT (OUTPATIENT)
Facility: CLINIC | Age: 60
End: 2025-06-27
Payer: COMMERCIAL

## 2025-06-27 DIAGNOSIS — C73 METASTASIS FROM THYROID CANCER (MULTI): ICD-10-CM

## 2025-06-27 DIAGNOSIS — C79.9 METASTASIS FROM THYROID CANCER (MULTI): ICD-10-CM

## 2025-07-03 ENCOUNTER — TELEPHONE (OUTPATIENT)
Dept: PRIMARY CARE | Facility: CLINIC | Age: 60
End: 2025-07-03
Payer: COMMERCIAL

## 2025-07-03 NOTE — TELEPHONE ENCOUNTER
Patient would like a letter stating that she is eligible for GOP 1, she doesn't need a script just a letter and diagnosis.

## 2025-07-09 ENCOUNTER — EVALUATION (OUTPATIENT)
Dept: SPEECH THERAPY | Facility: CLINIC | Age: 60
End: 2025-07-09
Payer: COMMERCIAL

## 2025-07-09 DIAGNOSIS — R49.0 HOARSENESS OF VOICE: Primary | ICD-10-CM

## 2025-07-09 DIAGNOSIS — R13.10 DYSPHAGIA, UNSPECIFIED TYPE: ICD-10-CM

## 2025-07-09 DIAGNOSIS — C73 METASTASIS FROM THYROID CANCER (MULTI): ICD-10-CM

## 2025-07-09 DIAGNOSIS — I89.0 LYMPHEDEMA: ICD-10-CM

## 2025-07-09 DIAGNOSIS — C79.9 METASTASIS FROM THYROID CANCER (MULTI): ICD-10-CM

## 2025-07-09 PROCEDURE — 92522 EVALUATE SPEECH PRODUCTION: CPT | Mod: GN | Performed by: SPEECH-LANGUAGE PATHOLOGIST

## 2025-07-09 PROCEDURE — 92526 ORAL FUNCTION THERAPY: CPT | Mod: GN | Performed by: SPEECH-LANGUAGE PATHOLOGIST

## 2025-07-09 ASSESSMENT — PAIN - FUNCTIONAL ASSESSMENT: PAIN_FUNCTIONAL_ASSESSMENT: 0-10

## 2025-07-09 ASSESSMENT — PAIN SCALES - GENERAL: PAINLEVEL_OUTOF10: 0 - NO PAIN

## 2025-07-09 NOTE — PROGRESS NOTES
Speech-Language Pathology    Outpatient Speech-Language Pathology Clinical Lymphedema Evaluation     Patient Name: Simi Jefferson  MRN: [id number]  : 1965  Today's Date: 25  Time Calculation  Start Time: 945  Stop Time: 1000  Time Calculation (min): 15 min     Current Problems:  Metastasis of thyroid cancer  Hoarseness of voice  Lymphedema   Dysphagia    SLP Assessment:  Simi Jefferson currently displays mild lymphedema with mild hoarseness of her voice.  This is characterized by a notable fluid retention in her neck regions as well as on her lower right quadrant of her face.    Patient was observed to have lymphedema in the submental area on her anterior neck.   Her voice was not fully assessed this date.  However, she was noted to have a slightly hoarse vocal quality.  She reports that she typically has a slightly hoarse voice, but it is more notable.  Pt also reports and demonstrated that she was having difficulty with changing pitches.  When she would try to do this, her voice would cut out on her.  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    She would benefit from manual lymphatic drainage (MLD) from skilled therapist and completion of daily home program to manage lymphedema. Overview of lymphatic system provided including impact on voice and swallow function and risk of fibrosis due to long standing lymphedema. She agrees to implement the home exercise plan and use of anterior/posterior approach of MLD to her submental region following manual trunk drainage. In addition, she will be educated on possible taping techniques and use of compression garments to assist with management of lymphedema. Patient understands education as noted through verbal and/or manual teach back.     Pt completed the EAT-10 this date and  scored as follows:  EAT 10  My swallowing problem has caused me to lose weight.: 0  My swallowing problem interferes with my ability to go out for meals.: 0  Swallowing liquids takes extra effort.: 0  Swallowing solids takes extra effort.: 0  Swallowing pills takes extra effort.: 2  Swallowing is painful: 1  The pleasure of eating is affected by my swallowing.: 0  When I swallow food sticks in my throat.: 1  I cough when I eat.: 0  Swallowing is stressful: 0  EAT-10 TOTAL SCORE:: 4  A score of 3 or higher indicates that there may be a swallowing dysfunction.  The higher the number, the more it is affecting the pt.     [facility] ([facility] Lymphedema Scale: 1a - Soft visible Edema, no pitting, reversible    Neck Measures:    Superior (just below mandible) 38 cm    Middle (midway between top and bottom) 37 cm    Inferior (lowest circumferential location) 35 cm    Total Neck Composite 110 cm       Facial Measures:   Measure  Right Left    1. Tragus to mental protuberance 16.5 cm 16 cm    2. Tragus to mouth angle 12 cm 11 cm    3. Mandibular angle to mental protuberance 13.5 cm 12 cm    4. Mandibular angle to nasal wing 13 cm 13 cm    5. Mandibular angle to internal eye corner 10 cm 10 cm    6. Mandibular angle to external eye corner 12.5 cm 12 cm    7. Mental protuberance to internal eye corner 18 cm 14 cm    Total Facial Composite 95 cm 90 cm       Lymphedema Site:   Using the [facility] Lymphedema Scale  face 1a - Soft visible Edema, no pitting, reversible, neck 1a - Soft visible Edema, no pitting, reversible, and submental 1a - Soft visible Edema, no pitting, reversible     Skilled Speech Therapy is medically necessary and ordered by a physician to help Simi Jefferson learn and utilize strategies and exercises to reduce the risk of aspiration and further prevent pneumonia.  Improved management of this pt's lymphatic system will further assist in maintaining effective swallowing strength and coordination.   This will also further allow the Simi Jefferson to maintain good levels of PO intake for overall improved nutrition as well as physical and social-emotional health.     Related Medical History:  Pt has a history of cancer and disease of the thyroid gland with pt having thyroid surgery in May of 2022 with myomectomy.  Pt recently had metastasis to her bilateral neck nodes and central neck nodes positive for papillary thyroid cancer.  She had surgery beginning of on 5/16/25 to remove these with a bilateral neck dissection.  Pt has had a lot of pain in her neck region following surgery.      SLP Plan:  Simi Jefferson is recommended to be seen for Skilled Speech Therapy 1 times per week for 6 weeks.    Discussed POC: Patient  Discussed Risks/Benefits: yes, with patient  Pt/Caregiver Agreeable: yes  Prognosis: Excellent    Goals:  By discharge Simi Jefferson will:  LTGs:    Pt will tolerate least restrictive diet without overt s/s of aspiration at 100% independently.   Patient will tolerate least restrictive diet consistencies with oral feeding as primary form of nutrition.  STGs:   1. Pt will have further voice abilities assessed at next session and goals to be generated at that time. Establish Date:  7/9/2025      Timeframe: 3 months   Re-Eval date: 10/7/2025      Status: Progressing Comments: Goal established this date.     2. Pt will learn and utilize MLD (Manual Lymphatic Draining) techniques with return demo at 90% independently to assist with improving tissues for better functioning for improved swallowing abilities. Establish Date:  7/9/2025      Timeframe: 3 months   Re-Eval date: 10/7/2025      Status: Progressing Comments: Goal established this date.     3. Pt will demonstrate a 2% reduction in lymphatic measures in the area of submental area of her neck to assist with improving swallowing functioning and voicing function.  Establish Date:  7/9/2025      Timeframe: 3 months   Re-Eval date: 10/7/2025      Status:  Progressing Comments: Goal established this date.     4. Pt/family education/support to be provided each session.  Establish Date:  7/9/2025      Timeframe: 3 months   Re-Eval date: 10/7/2025      Status: Progressing Comments: Goal established this date.     Barriers: none  Strengths: cognition, communication, motivation, cultural, and Mosque  There are no spiritual/cultural practices/values/needs that are important to know.   No overt symptoms/signs of abuse/neglect.   Pt/family prefer to learn via demonstration, printed materials, performance, and explanation/discussion    Subjective:   Simi Jefferson was seen 1-on-1. Simi Jefferson participated well throughout the session.  Living Environment: home  Patient Arrival: alone  Referred By: Dr. Kelvin Faustin  Date of Onset: 5/16/25  Reason for Referral: Lymphedema management post lymph node removal  Chief Complaint: difficulty with lymphedema and wanting to prevent fluid retention and avoid difficulty with swallowing and minimize difficulty with her voice.  Prior Level of Function: WFL  Previous Therapies: no    Baseline Observations:   Respiratory Status:  room air  Hearing: WFL  Vision: WFL  History of Intubation: yes  Behavior/Cognition: alert, cooperative, and pleasant mood  Patient Positioning: upright in chair  Baseline Vocal Quality: hoarse and breathy  Weight Loss: no  Dentition: adequate/natural    Insurance:  Reviewed: Yes  Number of Authorized Treatments : 20 visits PCY, hard limit  Total Number of Visits:  1    Pain:  Pain Assessment: 0-10   Pain Score: 0-10 (Numeric) Pain Score: 0 - No pain    Objective:  Targeted measurements of lymphedema and discussed how it can impact her if not managed in the future.      Oral Motor:   WFL    Laryngeal Assessment:     Oral Phase:   WFL    Pharyngeal Phase:   WFL    Pt does not currently report any difficulty with swallowing aside from pills taking extra effort and this is likely related to additional swelling from  her recent surgery.    She has been noticing difficulty with her voice.  She reports that her voice has always had a more hoarse vocal quality, however it is a bit more notable and she has noted that since her surgery she has had difficulty with pitch change. 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   However, pt is noted to have mild lymphedema at this time and it pt is not trained on how to manage it, she is at risk of having further difficulty with her voice as well as starting to have difficulty with eating.  Both of which would greatly decrease her quality of life and impact her ability to work and provide for herself and decrease her overall health.   Treatment:  Yes, reviewed what lymphedema is and how it can impact swallowing and voice.  Pt reports that she does have some difficulty with her voice.     Outpatient Education:   Reviewed results of today's assessment.  yes, with patient  Reviewed plan for Therapy and Simi Jefferson was in agreement of this.

## 2025-07-09 NOTE — PROGRESS NOTES
"Physical Therapy    Physical Therapy Treatment    Patient Name: Simi Jefferson  MRN: [id number]  Today's Date: 7/10/2025     Insurance - reviewed   Visit number: 2 (updated 07/09/25)   Payor: [employer] [facility] / Plan: [employer] MED / Product Type: *No Product type* /    40 PT/OT V PCY 0 USED NO AUTH NEEDED PAYS % DED MET   Referring Provider: Kelvin Faustin MD       Current Problem  Problem List Items Addressed This Visit    None    Precautions:  Fall Risk: None   Denies: Pacemaker, Diabetes, Hypertension, latex allergy, epilepsy/seizures, other known cardiac problems, other known neurologic problem, other known pulmonary problems, unexpected weight gain or loss, night sweats, night pain, diplopia, and drop attacks  Past Surgical history: thyroid surgery (2022), bilateral neck dissection (5/23/25) - pathology confirming multiple sites of metastasis with extranodal extension  Past Medical history: Cancer thyroid cancer, anemic    General  Subjective      Simi Jefferson {taldenies:50615::\"denies\"} any falls or complications since last session. Simi Jefferson reports ***    Simi Jefferson reports {talgfp:80081::\"good\"} compliance with HEP.    Pain:  ***/10  Pain location: ***      Objective     Treatments:  Abbreviation Key  NP = not performed this visit   NV = next visit  // = parallel    Assigned HEP- [employer] ***    Therapeutic Exercise:    minutes        Manual Therapy:    minutes   Precautions to manual: CA, TPR and MET OK. No mobs or STM   TrP release with tennis ball to rhomboid (HEP)       Outpatient Education: {Education:40688}   Simi Jefferson verbalizes understanding of all education provided: {yes,no:96319::\"Yes\"}    Assessment:  Simi Jefferson completed treatment today which focused upon *** in order to address ***.  Simi presents with ***.  Simi requires ***.  Simi was challenged with  ***.  Simi's  response to tx was: {Response to intervention:70246} Simi's Progress towards " goals: {Progress towards functional goals:75879} Simi Jefferson continues to have {taldeficits:68801} limiting participation in {yra8ywrgcqvowsfuxultbmcgfyayj:86067} Further skilled therapy services are warranted to address the remaining impairments in order to progress towards functional goals. Simi left session with all questions answered and no increase in symptoms.      Plan:  {.Daily note plan.:40330}

## 2025-07-10 ENCOUNTER — TREATMENT (OUTPATIENT)
Dept: PHYSICAL THERAPY | Facility: CLINIC | Age: 60
End: 2025-07-10
Payer: COMMERCIAL

## 2025-07-10 DIAGNOSIS — R29.898 SHOULDER WEAKNESS: ICD-10-CM

## 2025-07-10 PROCEDURE — 97530 THERAPEUTIC ACTIVITIES: CPT | Mod: GP

## 2025-07-10 PROCEDURE — 97110 THERAPEUTIC EXERCISES: CPT | Mod: GP

## 2025-07-10 NOTE — PROGRESS NOTES
Physical Therapy    Physical Therapy Treatment    Patient Name: Simi Jefferson  MRN: [id number]  Today's Date: 7/10/2025     Insurance - reviewed   Visit number: 2 (updated 07/10/25)   Payor: [employer] [facility] / Plan: [employer] MED / Product Type: *No Product type* /    40 PT/OT V PCY 0 USED NO AUTH NEEDED PAYS % DED MET   Referring Provider: Kelvin Faustin MD       Current Problem  Problem List Items Addressed This Visit           ICD-10-CM    Shoulder weakness R29.898     Precautions:  Fall Risk: None   Denies: Pacemaker, Diabetes, Hypertension, latex allergy, epilepsy/seizures, other known cardiac problems, other known neurologic problem, other known pulmonary problems, unexpected weight gain or loss, night sweats, night pain, diplopia, and drop attacks  Past Surgical history: thyroid surgery (2022), bilateral neck dissection (5/23/25) - 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 Simi Jefferson reports that she has been feeling pretty well, however, she has been having a rough time dealing with the emotional aspect of things. She has started traveling for work again and it has made her increasingly tired. She is still having difficulty with moving her R arm and reports feeling tightness throughout her R upper trap. She has also been using Scar Away strips to help with her scar on her neck.     Simi Jefferson reports compliance with HEP in regards to desensitization, but hasn't bought ball yet to complete self TPR.    Pain:  5/10  Pain location: R neck from top of neck to clavicle    Katherine Novak, SPT, participated during session.  Siria CASILLAS, PT, DPT, NCS directly supervised during session.       Objective     Treatments:  Abbreviation Key  NP = not performed this visit   NV = next visit  // =  "parallel    Manual Therapy:  3 minutes   Precautions to manual: CA, TPR and MET OK. No mobs or STM   TrP release with tennis ball to rhomboid (instructed in performance for HEP)     Therapeutic Exercise:  5 minutes    3 way postural exercises against wall - 1x10 each  Elbows bent, elbows straight palms to wall, elbows straight back of hand to wall  Cues to keep shoulders pressed down throughout the exercises    Therapeutic Activity: 34 minutes  Education  Discussed the techniques for desensitization as discussed at initial eval (starting with light touch with soft fabrics and progressing to rougher fabrics as tolerated).  Discussed how central sensitization contributes to her significant difficulty watching other people touch around their neck where her scar is. Educated on using mental imagery to imagine herself touching her neck. See mental imagery section below for more information.  Discussed post surgery changes and how the brain takes time to get adjusted to the \"new normal\" where her scar is. Specifically talked about the brain's homunculus and how bringing about a change to an area after surgery can lead to that area of the homunculus changing as well, requiring the brain to rewire itself back to normal over time with the correct interventions.  Answered Simi's questions about strengthening/prognosis of UE function which can depend on what is contributing to her shoulder pain and weakness.  Educated about the purpose of the [employer]elligent [employer]myta[employer]ise michaela, how she can replicate what the Intelligent [employer]my[employer]xi[employer]ise michaela does at home via looking at pictures in magazines/photo albums and determining what way a person is looking to improve R/L discrimination.  Mental imagery (HEP)  Feet grounded on floor, eyes closed, imagining herself touching along the scar on her neck.  Used to help patient get adjusted to the thought of touching her neck. Although she is able to touch her neck with no issues, she continues to have difficulty imagining herself " "doing this as it brings about her \"menopausal like\" symptoms such as beginning to sweat.  Ziebel Emi (HEP)  For Neck - Completion of Basic setting in emi  Scores:  1) L 1.98s, 80%; R 2.49s, 100%  2) L 1.45s, 50%; R 1.91s, 80%  3) L 2.47s, 90%; R 1.61s, 80%  4) L 1.06s, 20%; R 2.06s 80%      Outpatient Education: Education as detailed under theract section above.  Simi Jefferson verbalizes understanding of all education provided: Yes    Assessment:  Simi Jefferson completed treatment today which focused upon patient education, use of the Ziebel emi, and mental imagery to continue addressing her recovery from neck surgery in May. Throughout today's session, patient was very receptive to all education provided, especially in regard to mental imagery and understanding how neuroplasticity has to occur over time so that her incision along her neck begins to feel \"normal\" again. Spent significant time educating on the Ziebel emi and using this emi during her session today. Patient completed Basic setting in emi and continued to score below what was necessary to move onto the next level of exercises today. 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 Educated on the importance of continuing these exercises at home either by looking at pictures of people in magazines or photo albums and determining which direction they are looking. Concluded today's session with reviewing the manual therapy in her home program and introducing new postural exercises following her complaint that she has noticed her posture has been worse since her surgery. Simi's  response to tx was: Patient tolerated therapeutic interventions well and without any adverse events. Simi's Progress towards goals: Patient reports there has not been a significant change in functional abilities. Simi Jefferson continues to have decreased strength, decreased ROM, and pain " limiting participation in household chores, recreational activities, and occupational tasks Further skilled therapy services are warranted to address the remaining impairments in order to progress towards functional goals. Simi left session with all questions answered and no increase in symptoms.      Plan: Begin progressing ROM and strength exercises for shoulder (consider gravity eliminated). Continue with mental imagery and Recognise michaela to help with surgical site discomfort and address central sensitization.      Time Calculation  Start Time: 0748  Stop Time: 0830  Time Calculation (min): 42 min     PT Therapeutic Procedures Time Entry  Therapeutic Exercise Time Entry: 5  Manual Therapy Time Entry: 3  Therapeutic Activity Time Entry: 34

## 2025-07-15 ENCOUNTER — TELEPHONE (OUTPATIENT)
Dept: SPEECH THERAPY | Facility: CLINIC | Age: 60
End: 2025-07-15
Payer: COMMERCIAL

## 2025-07-15 DIAGNOSIS — R49.0 HOARSENESS OF VOICE: Primary | ICD-10-CM

## 2025-07-15 DIAGNOSIS — I89.0 LYMPHEDEMA: ICD-10-CM

## 2025-07-17 NOTE — PROGRESS NOTES
Physical Therapy    Physical Therapy Treatment    Patient Name: Simi Jefferson  MRN: [id number]  Today's Date: 7/18/2025     Insurance - reviewed   Visit number: 3 (updated 07/17/25)   Payor: [employer] [facility] / Plan: [employer] / Product Type: *No Product type* /    40 PT/OT V PCY 0 USED NO AUTH NEEDED PAYS % DED MET   Referring Provider: Kelvin Faustin MD       Current Problem  Problem List Items Addressed This Visit    None      Precautions:  Fall Risk: None   Denies: Pacemaker, Diabetes, Hypertension, latex allergy, epilepsy/seizures, other known cardiac problems, other known neurologic problem, other known pulmonary problems, unexpected weight gain or loss, night sweats, night pain, diplopia, and drop attacks  Past Surgical history: thyroid surgery (2022), bilateral neck dissection (5/23/25) - 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 Simi Jefferson reports that she has been feeling pretty well, however, she has been having a rough time dealing with the emotional aspect of things. She has started traveling for work again and it has made her increasingly tired. She is still having difficulty with moving her R arm and reports feeling tightness throughout her R upper trap. She has also been using Scar Away strips to help with her scar on her neck.     Simi Jefferson reports compliance with HEP in regards to desensitization, but hasn't bought ball yet to complete self TPR.    Pain:  5/10  Pain location: R neck from top of neck to clavicle    Katherine Novak, YOANDY, participated during session.  Siria CASILLAS PT, DPT, NCS directly supervised during session.       Objective     Treatments:  Abbreviation Key  NP = not performed this visit   NV = next visit  // = parallel    Manual Therapy:    minutes   Precautions  "to manual: CA, TPR and MET OK. No mobs or STM   TrP release with tennis ball to rhomboid (instructed in performance for HEP)     Therapeutic Exercise:    minutes    3 way postural exercises against wall - 1x10 each  Elbows bent, elbows straight palms to wall, elbows straight back of hand to wall  Cues to keep shoulders pressed down throughout the exercises    Therapeutic Activity:   minutes  Education  Discussed the techniques for desensitization as discussed at initial eval (starting with light touch with soft fabrics and progressing to rougher fabrics as tolerated).  Discussed how central sensitization contributes to her significant difficulty watching other people touch around their neck where her scar is. Educated on using mental imagery to imagine herself touching her neck. See mental imagery section below for more information.  Discussed post surgery changes and how the brain takes time to get adjusted to the \"new normal\" where her scar is. Specifically talked about the brain's homunculus and how bringing about a change to an area after surgery can lead to that area of the homunculus changing as well, requiring the brain to rewire itself back to normal over time with the correct interventions.  Answered Simi's questions about strengthening/prognosis of UE function which can depend on what is contributing to her shoulder pain and weakness.  Educated about the purpose of the Recognise michaela, how she can replicate what the Swift[employer]ise michaela does at home via looking at pictures in magazines/photo albums and determining what way a person is looking to improve R/L discrimination.  Mental imagery (HEP)  Feet grounded on floor, eyes closed, imagining herself touching along the scar on her neck.  Used to help patient get adjusted to the thought of touching her neck. Although she is able to touch her neck with no issues, she continues to have difficulty imagining herself doing this as it brings about her \"menopausal like\" " "symptoms such as beginning to sweat.  [employer] Emi (HEP)  For Neck - Completion of Basic setting in emi  Scores:  1) L 1.98s, 80%; R 2.49s, 100%  2) L 1.45s, 50%; R 1.91s, 80%  3) L 2.47s, 90%; R 1.61s, 80%  4) L 1.06s, 20%; R 2.06s 80%      Outpatient Education: Education as detailed under theract section above.  Simi Jefferson verbalizes understanding of all education provided: Yes    Assessment:  Simi Jefferson completed treatment today which focused upon patient education, use of the [employer]ise emi, and mental imagery to continue addressing her recovery from neck surgery in May. Throughout today's session, patient was very receptive to all education provided, especially in regard to mental imagery and understanding how neuroplasticity has to occur over time so that her incision along her neck begins to feel \"normal\" again. Spent significant time educating on the [employer] emi and using this emi during her session today. Patient completed Basic setting in emi and continued to score below what was necessary to move onto the next level of exercises today. 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 Educated on the importance of continuing these exercises at home either by looking at pictures of people in magazines or photo albums and determining which direction they are looking. Concluded today's session with reviewing the manual therapy in her home program and introducing new postural exercises following her complaint that she has noticed her posture has been worse since her surgery. Simi's  response to tx was: Patient tolerated therapeutic interventions well and without any adverse events. Simi's Progress towards goals: Patient reports there has not been a significant change in functional abilities. Simi Jefferson continues to have decreased strength, decreased ROM, and pain limiting participation in household chores, " recreational activities, and occupational tasks Further skilled therapy services are warranted to address the remaining impairments in order to progress towards functional goals. Simi left session with all questions answered and no increase in symptoms.      Plan: Begin progressing ROM and strength exercises for shoulder (consider gravity eliminated). Continue with mental imagery and Recognise michaela to help with surgical site discomfort and address central sensitization.

## 2025-07-18 ENCOUNTER — DOCUMENTATION (OUTPATIENT)
Dept: PHYSICAL THERAPY | Facility: CLINIC | Age: 60
End: 2025-07-18
Payer: COMMERCIAL

## 2025-07-18 ENCOUNTER — TREATMENT (OUTPATIENT)
Dept: PHYSICAL THERAPY | Facility: CLINIC | Age: 60
End: 2025-07-18
Payer: COMMERCIAL

## 2025-07-18 NOTE — PROGRESS NOTES
Physical Therapy                 Therapy Communication Note    Patient Name: Simi Jefferson  MRN: [id number]  Department:   Room: Room/bed info not found  Today's Date: 7/18/2025     Discipline: Physical Therapy    Missed Visit:       Missed Visit Reason:  out of town    Missed Time: Cancel    Comment:

## 2025-07-28 NOTE — PROGRESS NOTES
"Physical Therapy    Physical Therapy Treatment    Patient Name: Simi Jefferson  MRN: [id number]  Today's Date: 7/29/2025     Insurance - reviewed   Visit number: 3 (updated 07/28/25)   Payor: [employer] [facility] / Plan: [employer] / Product Type: *No Product type* /    40 PT/OT V PCY 0 USED NO AUTH NEEDED PAYS % DED MET   Referring Provider: Kelvin Faustin MD       Current Problem  Problem List Items Addressed This Visit    None      Precautions:  Fall Risk: None   Denies: Pacemaker, Diabetes, Hypertension, latex allergy, epilepsy/seizures, other known cardiac problems, other known neurologic problem, other known pulmonary problems, unexpected weight gain or loss, night sweats, night pain, diplopia, and drop attacks  Past Surgical history: thyroid surgery (2022), bilateral neck dissection (5/23/25) - pathology confirming multiple sites of metastasis with extranodal extension  Past Medical history: Cancer thyroid cancer, anemic    General  Subjective      Simi Jefferson {taldenies:[id number]::\"denies\"} any falls or complications since last session. Simi Jefferson reports ***    Simi [place] reports {talgfp:[id number]::\"good\"} compliance with HEP.    Pain:  ***/10  Pain location: ***  Pain location: R neck from top of neck to clavicle      Objective     Treatments:  Abbreviation Key  NP = not performed this visit   NV = next visit  // = parallel    Manual Therapy:    minutes   Precautions to manual: CA, TPR and MET OK. No mobs or STM   TrP release with tennis ball to rhomboid (instructed in performance for HEP)     Therapeutic Exercise:    minutes    3 way postural exercises against wall - 1x10 each  Elbows bent, elbows straight palms to wall, elbows straight back of hand to wall  Cues to keep shoulders pressed down throughout the exercises  Supine shoulder, shoulder want, table slides, pendulums          Outpatient Education: Education as detailed under theract section above.  Simi Jefferson verbalizes " "understanding of all education provided: Yes    Assessment:  Simi Jefferson completed treatment today which focused upon patient education, use of the [employer] michaela, and mental imagery to continue addressing her recovery from neck surgery in May. Throughout today's session, patient was very receptive to all education provided, especially in regard to mental imagery and understanding how neuroplasticity has to occur over time so that her incision along her neck begins to feel \"normal\" again. Spent significant time educating on the [employer] michaela and using this michaela during her session today. Patient completed Basic setting in michaela and continued to score below what was necessary to move onto the next level of exercises today. 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 Educated on the importance of continuing these exercises at home either by looking at pictures of people in magazines or photo albums and determining which direction they are looking. Concluded today's session with reviewing the manual therapy in her home program and introducing new postural exercises following her complaint that she has noticed her posture has been worse since her surgery. Simi's  response to tx was: Patient tolerated therapeutic interventions well and without any adverse events. Simi's Progress towards goals: Patient reports there has not been a significant change in functional abilities. Simi Jefferson continues to have decreased strength, decreased ROM, and pain limiting participation in household chores, recreational activities, and occupational tasks Further skilled therapy services are warranted to address the remaining impairments in order to progress towards functional goals. Simi left session with all questions answered and no increase in symptoms.      Plan: Begin progressing ROM and strength exercises for shoulder (consider gravity " eliminated). Continue with mental imagery and Recognise michaela to help with surgical site discomfort and address central sensitization.

## 2025-07-29 ENCOUNTER — TREATMENT (OUTPATIENT)
Dept: PHYSICAL THERAPY | Facility: CLINIC | Age: 60
End: 2025-07-29
Payer: COMMERCIAL

## 2025-07-29 ENCOUNTER — DOCUMENTATION (OUTPATIENT)
Dept: PHYSICAL THERAPY | Facility: CLINIC | Age: 60
End: 2025-07-29
Payer: COMMERCIAL

## 2025-07-29 NOTE — PROGRESS NOTES
"Physical Therapy    Therapy Communication Note    Patient Name: Simi Jefferson  MRN: [id number]  Today's Date: 7/29/2025     Discipline: Physical Therapy    Missed Visit Reason: per PSR: \"JUST SPOKE TO PT, CONFLICT,R/S FOR 09/09 AND ADDED TO WAITLIST FOR THIS WEEK\"    Missed Time: Cancel  "

## 2025-08-05 ENCOUNTER — TREATMENT (OUTPATIENT)
Dept: PHYSICAL THERAPY | Facility: CLINIC | Age: 60
End: 2025-08-05
Payer: COMMERCIAL

## 2025-08-05 DIAGNOSIS — R29.898 SHOULDER WEAKNESS: ICD-10-CM

## 2025-08-05 PROCEDURE — 97530 THERAPEUTIC ACTIVITIES: CPT | Mod: GP

## 2025-08-05 PROCEDURE — 97110 THERAPEUTIC EXERCISES: CPT | Mod: GP

## 2025-08-05 NOTE — PROGRESS NOTES
Physical Therapy    Physical Therapy Treatment    Patient Name: Simi Jefferson  MRN: [id number]  Today's Date: 8/5/2025  Time Calculation  Start Time: 0852  Stop Time: 0933  Time Calculation (min): 41 min  Insurance - reviewed   Visit number: 3 (updated 08/05/25)   Payor: [employer] [facility] / Plan: [employer] / Product Type: *No Product type* /    40 PT/OT V PCY 0 USED NO AUTH NEEDED PAYS % DED MET   Referring Provider: Kelvin Faustin MD       Current Problem  Problem List Items Addressed This Visit           ICD-10-CM    Shoulder weakness R29.898       Precautions:  Fall Risk: None   Denies: Pacemaker, Diabetes, Hypertension, latex allergy, epilepsy/seizures, other known cardiac problems, other known neurologic problem, other known pulmonary problems, unexpected weight gain or loss, night sweats, night pain, diplopia, and drop attacks  Past Surgical history: thyroid surgery (2022), bilateral neck dissection (5/23/25) - 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 Simi Jefferson reports that she has been doing ok. She states that her other shoulder has been bothering her more. Her right shoulder hurts primarily on the front/side of her shoulder that increases with lifting. Patient has been taking ibuprofen for pain temporarily. She has been able to do some lifting with her left arm but motion is limited. In terms of touching her neck, she notes 30-40% improvement. She hasn't done a lot of the mental imagery or Recognise micahela exercises. She has been completing her rhomboid massage with tennis ball and 3 way postural exercises regularly.    Simi Jefferson reports fair compliance with HEP     Pain:  5-6/10  Pain location: R shoulder    Katherine Novak, SPT, participated during session.  Siria CASILLAS, PT, DPT, NCS  directly supervised during session.         Objective     ROM (in sitting):  Shoulder flexion: R WNL w/ pain  Shoulder abduction : R WNL w/ pain  Shoulder ER in 90 degrees ABD: R WNL  Shoulder IR in 90 degrees ABD: R WNL    Special Tests:  Susana Silvestre: R (+)  Empty Can: R (-)  O-Ignacio's: R (-)  Belly Press: R (-)  Lift off: R (-)    Treatments:  Abbreviation Key  NP = not performed this visit   NV = next visit  // = parallel    Access Code: [email or web address]   Exercises  - Seated Shoulder Flexion AAROM with Dowel  - 1 x daily - 7 x weekly - 2 sets - 10 reps  - Supine Shoulder Abduction AROM  - 1 x daily - 7 x weekly - 2 sets - 10 reps  - Supine Shoulder Flexion Extension Full Range AROM  - 1 x daily - 7 x weekly - 2 sets - 10 reps  - Shoulder External Rotation and Scapular Retraction with Resistance  - 1 x daily - 7 x weekly - 2 sets - 10 reps  - Standing Bent Over Single Arm Scapular Row with Table Support  - 1 x daily - 7 x weekly - 2 sets - 10 reps    Therapeutic Activity: 10 minutes  Completion of ROM and special tests for R shoulder based upon recent increase in pain  Education - see below    Therapeutic Exercise:  31 minutes    Shoulder AAROM with cane supine  Too easy, not included in HEP  Shoulder AROM sidelying  Trialed to work on AROM of left shoulder, pain with R sidelying in R shoulder so terminated exercise  Shoulder AROM supine  Flexion w/ 2# weight  Abduction - no weight  Shoulder AAROM with cane seated  Flexion  Bilateral ER with red TB  Bent over rows bilaterally - 2# weight      Outpatient Education: Education regarding purpose of exercises provided at today's session. Education regarding relevant anatomy and possible subacromial impingement, likely due to overuse since she is unable to use her other arm as much, based upon results of special tests and ROM assessment completed today.   Simi Li verbalizes understanding of all education provided: Yes    Assessment:  Simi Jefferson completed treatment  today which focused upon addressing her bilateral shoulder weakness and shoulder AROM deficits on the L. At beginning of session, complaints reported of increased shoulder pain on the right. Upon completion of ROM and special tests, presentation most consistent with probable subacromial impingement. For the remainder of appointment, introduced exercises to address her shoulder weakness and AROM deficits. Patient able to complete all exercises introduced with no pain and proper form given minimal verbal, visual, and tactile cues. She was limited in ability to complete flexion AROM on the L completely gravity eliminated due to inability to lie on R shoulder due to pain.  Simi's  response to tx was: Patient tolerated therapeutic interventions well and without any adverse events. Simi's Progress towards goals: improvements noted in ability to touch scar on neck. Simi Jefferson continues to have decreased strength, decreased ROM, and pain limiting participation in household chores, recreational activities, and occupational tasks Further skilled therapy services are warranted to address the remaining impairments in order to progress towards functional goals. Simi left session with all questions answered and no increase in symptoms.      Plan: continue with AROM and postural strengthening exercises. Consider adding scapular stability exercises at n.v. Continue with mental imagery and use of [employer] michaela to help with surgical site discomfort and central sensitization.      Time Calculation  Start Time: 0852  Stop Time: 0933  Time Calculation (min): 41 min     PT Therapeutic Procedures Time Entry  Therapeutic Exercise Time Entry: 31  Therapeutic Activity Time Entry: 10

## 2025-08-06 DIAGNOSIS — Z12.31 ENCOUNTER FOR SCREENING MAMMOGRAM FOR BREAST CANCER: ICD-10-CM

## 2025-08-06 DIAGNOSIS — Z12.12 SCREENING FOR COLORECTAL CANCER: ICD-10-CM

## 2025-08-06 DIAGNOSIS — Z12.11 SCREENING FOR COLORECTAL CANCER: ICD-10-CM

## 2025-08-12 ENCOUNTER — DOCUMENTATION (OUTPATIENT)
Dept: PHYSICAL THERAPY | Facility: CLINIC | Age: 60
End: 2025-08-12
Payer: COMMERCIAL

## 2025-08-12 ENCOUNTER — TREATMENT (OUTPATIENT)
Dept: PHYSICAL THERAPY | Facility: CLINIC | Age: 60
End: 2025-08-12
Payer: COMMERCIAL

## 2025-08-12 DIAGNOSIS — R29.898 SHOULDER WEAKNESS: Primary | ICD-10-CM

## 2025-08-19 ENCOUNTER — TREATMENT (OUTPATIENT)
Dept: PHYSICAL THERAPY | Facility: CLINIC | Age: 60
End: 2025-08-19
Payer: COMMERCIAL

## 2025-08-19 DIAGNOSIS — R29.898 SHOULDER WEAKNESS: ICD-10-CM

## 2025-08-19 PROCEDURE — 97110 THERAPEUTIC EXERCISES: CPT | Mod: GP,CQ

## 2025-08-19 PROCEDURE — 97530 THERAPEUTIC ACTIVITIES: CPT | Mod: GP,CQ

## 2025-08-20 ENCOUNTER — APPOINTMENT (OUTPATIENT)
Dept: RADIOLOGY | Facility: CLINIC | Age: 60
End: 2025-08-20
Payer: COMMERCIAL

## 2025-08-20 DIAGNOSIS — Z12.31 ENCOUNTER FOR SCREENING MAMMOGRAM FOR BREAST CANCER: ICD-10-CM

## 2025-08-20 PROCEDURE — 77063 BREAST TOMOSYNTHESIS BI: CPT | Performed by: RADIOLOGY

## 2025-08-20 PROCEDURE — 77067 SCR MAMMO BI INCL CAD: CPT | Performed by: RADIOLOGY

## 2025-08-20 PROCEDURE — 77063 BREAST TOMOSYNTHESIS BI: CPT

## 2025-08-21 ENCOUNTER — TELEPHONE (OUTPATIENT)
Dept: SPEECH THERAPY | Facility: CLINIC | Age: 60
End: 2025-08-21
Payer: COMMERCIAL

## 2025-08-21 DIAGNOSIS — R49.0 HOARSENESS OF VOICE: Primary | ICD-10-CM

## 2025-08-21 DIAGNOSIS — I89.0 LYMPHEDEMA: ICD-10-CM

## 2025-08-25 ENCOUNTER — APPOINTMENT (OUTPATIENT)
Dept: SPEECH THERAPY | Facility: CLINIC | Age: 60
End: 2025-08-25
Payer: COMMERCIAL

## 2025-08-25 DIAGNOSIS — R13.10 DYSPHAGIA, UNSPECIFIED TYPE: ICD-10-CM

## 2025-08-25 DIAGNOSIS — R49.0 HOARSENESS OF VOICE: Primary | ICD-10-CM

## 2025-08-25 DIAGNOSIS — I89.0 LYMPHEDEMA: ICD-10-CM

## 2025-08-25 PROCEDURE — 92507 TX SP LANG VOICE COMM INDIV: CPT | Mod: GN | Performed by: SPEECH-LANGUAGE PATHOLOGIST

## 2025-08-25 PROCEDURE — 92526 ORAL FUNCTION THERAPY: CPT | Mod: GN | Performed by: SPEECH-LANGUAGE PATHOLOGIST

## 2025-08-25 ASSESSMENT — PAIN - FUNCTIONAL ASSESSMENT: PAIN_FUNCTIONAL_ASSESSMENT: 0-10

## 2025-08-25 ASSESSMENT — PAIN SCALES - GENERAL: PAINLEVEL_OUTOF10: 0 - NO PAIN

## 2025-08-26 ENCOUNTER — APPOINTMENT (OUTPATIENT)
Dept: PHYSICAL THERAPY | Facility: CLINIC | Age: 60
End: 2025-08-26
Payer: COMMERCIAL

## 2025-08-27 LAB — NONINV COLON CA DNA+OCC BLD SCRN STL QL: NEGATIVE

## 2025-09-02 ENCOUNTER — APPOINTMENT (OUTPATIENT)
Dept: PHYSICAL THERAPY | Facility: CLINIC | Age: 60
End: 2025-09-02
Payer: COMMERCIAL

## 2025-09-03 ENCOUNTER — APPOINTMENT (OUTPATIENT)
Dept: SPEECH THERAPY | Facility: CLINIC | Age: 60
End: 2025-09-03
Payer: COMMERCIAL

## 2025-09-03 ASSESSMENT — PAIN - FUNCTIONAL ASSESSMENT: PAIN_FUNCTIONAL_ASSESSMENT: 0-10

## 2025-09-03 ASSESSMENT — PAIN SCALES - GENERAL: PAINLEVEL_OUTOF10: 0 - NO PAIN

## 2025-09-09 ENCOUNTER — APPOINTMENT (OUTPATIENT)
Dept: PHYSICAL THERAPY | Facility: CLINIC | Age: 60
End: 2025-09-09
Payer: COMMERCIAL

## (undated) DEVICE — PROBE, STIMULATOR, MONOPOLAR, FLUSH TIP, PRASS, W/HANDLE, STANDARD

## (undated) DEVICE — SUTURE, VICRYL, 3-0, 18 IN, PS2, UNDYED

## (undated) DEVICE — EVACUATOR, WOUND, SUCTION, CLOSED, JACKSON-PRATT, 100 CC, SILICONE

## (undated) DEVICE — ELECTRODE, PAIRED SUBDERMAL OTO

## (undated) DEVICE — PREP TRAY, VAGINAL

## (undated) DEVICE — NEEDLE, ELECTRODE, ELECTROSURGICAL, INSULATED

## (undated) DEVICE — TOWEL PACK, STERILE, 4/PACK, BLUE

## (undated) DEVICE — Device

## (undated) DEVICE — SOLUTION, IRRIGATION, SODIUM CHLORIDE 0.9%, 1000 ML, POUR BOTTLE

## (undated) DEVICE — SUTURE, SILK, 3-0, 30 IN, MULTIPACK, BLACK

## (undated) DEVICE — SHEARS, CURVED 9CM HARMONIC FOCUS

## (undated) DEVICE — SUTURE, MONOCRYLIC, 4-0, P3, MONO 18

## (undated) DEVICE — SOLUTION, IRRIGATION, STERILE WATER, 1000 ML, POUR BOTTLE

## (undated) DEVICE — SUTURE, VICRYL, 3-0,18 IN, SH, UNDYED

## (undated) DEVICE — STAPLER, SKIN, PLUS, WIDE, 35

## (undated) DEVICE — CONTAINER, SPECIMEN, 4 OZ, OR PEEL PACK, STERILE

## (undated) DEVICE — GLOVE, SURGICAL, PROTEXIS PI , 7.5, PF, LF

## (undated) DEVICE — TUBE, EMG ENDOTRACHEAL SZ 7 TRIVANTAGE

## (undated) DEVICE — STAY SET, SURGICAL , 5MM SHARP HOOK, CS/ 50

## (undated) DEVICE — DRAIN, WOUND, FLAT, HUBLESS, FULL LENGTH PERFORATION, 10 MM X 20 CM, SILICONE

## (undated) DEVICE — SUTURE, SILK, 2-0, TIES, 12-30 IN, BLACK

## (undated) DEVICE — SPONGE, HEMOSTAT, SURGICEL FIBRILLAR, ABS, 4 X 4, LF